# Patient Record
Sex: FEMALE | Race: WHITE | NOT HISPANIC OR LATINO | Employment: OTHER | URBAN - NONMETROPOLITAN AREA
[De-identification: names, ages, dates, MRNs, and addresses within clinical notes are randomized per-mention and may not be internally consistent; named-entity substitution may affect disease eponyms.]

---

## 2017-08-11 ENCOUNTER — APPOINTMENT (OUTPATIENT)
Dept: WOUND CARE | Facility: HOSPITAL | Age: 67
End: 2017-08-11

## 2017-08-11 ENCOUNTER — LAB REQUISITION (OUTPATIENT)
Dept: LAB | Facility: HOSPITAL | Age: 67
End: 2017-08-11

## 2017-08-11 DIAGNOSIS — Z00.00 ENCOUNTER FOR GENERAL ADULT MEDICAL EXAMINATION WITHOUT ABNORMAL FINDINGS: ICD-10-CM

## 2017-08-11 PROCEDURE — 87205 SMEAR GRAM STAIN: CPT | Performed by: SURGERY

## 2017-08-11 PROCEDURE — 87075 CULTR BACTERIA EXCEPT BLOOD: CPT | Performed by: SURGERY

## 2017-08-11 PROCEDURE — G0463 HOSPITAL OUTPT CLINIC VISIT: HCPCS

## 2017-08-11 PROCEDURE — 87185 SC STD ENZYME DETCJ PER NZM: CPT | Performed by: SURGERY

## 2017-08-11 PROCEDURE — 87070 CULTURE OTHR SPECIMN AEROBIC: CPT | Performed by: SURGERY

## 2017-08-11 PROCEDURE — 87186 SC STD MICRODIL/AGAR DIL: CPT | Performed by: SURGERY

## 2017-08-11 PROCEDURE — 87147 CULTURE TYPE IMMUNOLOGIC: CPT | Performed by: SURGERY

## 2017-08-13 LAB
BACTERIA SPEC AEROBE CULT: ABNORMAL
GRAM STN SPEC: ABNORMAL
GRAM STN SPEC: ABNORMAL

## 2017-08-24 ENCOUNTER — APPOINTMENT (OUTPATIENT)
Dept: WOUND CARE | Facility: HOSPITAL | Age: 67
End: 2017-08-24

## 2017-09-05 ENCOUNTER — OFFICE VISIT (OUTPATIENT)
Dept: VASCULAR SURGERY | Facility: CLINIC | Age: 67
End: 2017-09-05

## 2017-09-05 ENCOUNTER — APPOINTMENT (OUTPATIENT)
Dept: WOUND CARE | Facility: HOSPITAL | Age: 67
End: 2017-09-05

## 2017-09-05 VITALS
HEIGHT: 63 IN | HEART RATE: 80 BPM | DIASTOLIC BLOOD PRESSURE: 70 MMHG | BODY MASS INDEX: 44.12 KG/M2 | WEIGHT: 249 LBS | SYSTOLIC BLOOD PRESSURE: 122 MMHG

## 2017-09-05 DIAGNOSIS — I87.8 VENOUS STASIS: ICD-10-CM

## 2017-09-05 DIAGNOSIS — IMO0001 CHRONIC VENOUS HYPERTENSION WITH ULCER, BILATERAL: Primary | ICD-10-CM

## 2017-09-05 DIAGNOSIS — I87.2 VENOUS (PERIPHERAL) INSUFFICIENCY: ICD-10-CM

## 2017-09-05 DIAGNOSIS — M79.89 LEG SWELLING: ICD-10-CM

## 2017-09-05 PROCEDURE — 99204 OFFICE O/P NEW MOD 45 MIN: CPT | Performed by: SURGERY

## 2017-09-05 NOTE — PROGRESS NOTES
09/05/2017      Clemente Arceo MD  [address] [facility]  [facility] [address]  [place] [address]    Lauren Colón  1950    Chief Complaint   Patient presents with   • Chronic Venous Insufficiency     New referral per Dr Arceo       Dear Clemente Arceo MD:      HPI  I had the pleasure of seeing your patient Lauren Colón in the office today.  Thank you kindly for this consultation.  As you recall, Lauren Colón is a 67 y.o.  female who you are currently following for Lower extremity venous wounds.  She has had a long-standing history of leg swelling with ulceration of bilateral lower extremity shins.  She has evidence of stasis dermatitis and chronic swelling.  She has never tried compression stockings and denies any history of DVT or phlebitis.    Past Medical History:   Diagnosis Date   • Arthritis    • Cataract    • Cataract    • Chronic venous insufficiency    • Diabetes mellitus    • Disease of thyroid gland    • Gout    • History of cellulitis    • Leg swelling    • Neuropathy    • Sleep apnea        Past Surgical History:   Procedure Laterality Date   • CHOLECYSTECTOMY     • OVARIAN CYST SURGERY Right    • TUBAL ABDOMINAL LIGATION         Family History   Problem Relation Age of Onset   • Diabetes Mother    • Hearing loss Maternal Grandmother    • Hearing loss Maternal Grandfather    • Cancer Other    • Seizures Other    • Thyroid disease Other    • Tuberculosis Other    • Tuberculosis Father        Social History     Social History   • Marital status:      Spouse name: N/A   • Number of children: N/A   • Years of education: N/A     Occupational History   • Not on file.     Social History Main Topics   • Smoking status: Former Smoker     Quit date: 2014   • Smokeless tobacco: Never Used   • Alcohol use Yes      Comment: rare   • Drug use: No   • Sexual activity: Defer     Other Topics Concern   • Not on file     Social History Narrative       Allergies   Allergen Reactions   • Tofranil  "[Imipramine Hcl] Itching       Prior to Admission medications    Medication Sig Start Date End Date Taking? Authorizing Provider   citalopram (CeleXA) 20 MG tablet Take 20 mg by mouth Daily.   Yes Historical Provider, MD   clonazePAM (KlonoPIN) 0.5 MG tablet  8/16/17  Yes Historical Provider, MD   donepezil (ARICEPT) 10 MG tablet Take 10 mg by mouth Every Night.   Yes Historical Provider, MD   insulin detemir (LEVEMIR) 100 UNIT/ML injection Inject  under the skin Daily.   Yes Historical Provider, MD   levothyroxine (SYNTHROID, LEVOTHROID) 137 MCG tablet Take 137 mcg by mouth Daily.   Yes Historical Provider, MD   metFORMIN (GLUCOPHAGE) 850 MG tablet Take 850 mg by mouth.   Yes Historical Provider, MD   Multiple Vitamins-Minerals (MULTIVITAMIN PO) Take  by mouth.   Yes Historical Provider, MD   mupirocin (BACTROBAN) 2 % ointment Apply  topically.   Yes Historical Provider, MD   Nystatin powder    Yes Historical Provider, MD   pantoprazole (PROTONIX) 40 MG EC tablet Take 40 mg by mouth Daily.   Yes Historical Provider, MD   QUEtiapine (SEROquel) 25 MG tablet  8/16/17  Yes Historical Provider, MD   simvastatin (ZOCOR) 40 MG tablet Take 40 mg by mouth Every Night.   Yes Historical Provider, MD   cimetidine (TAGAMET) 300 MG tablet Take 300 mg by mouth 2 (Two) Times a Day.  9/5/17 Yes Historical Provider, MD       Review of Systems   Constitutional: Negative.    HENT: Negative.    Eyes: Negative.    Respiratory: Negative.    Cardiovascular: Negative.    Gastrointestinal: Negative.    Endocrine: Negative.    Genitourinary: Negative.    Musculoskeletal: Negative.    Skin: Positive for color change and wound (bilateral lower extremities).   Allergic/Immunologic: Negative.    Neurological: Negative.    Hematological: Negative.    Psychiatric/Behavioral: Negative.        /70  Pulse 80  Ht 63\" (160 cm)  Wt 249 lb (113 kg)  BMI 44.11 kg/m2  Physical Exam   Constitutional: She is oriented to person, place, and time. She " appears well-developed and well-nourished.   HENT:   Head: Normocephalic and atraumatic.   Eyes: Pupils are equal, round, and reactive to light. No scleral icterus.   Neck: Neck supple. No JVD present. Carotid bruit is not present. No thyromegaly present.   Cardiovascular: Normal rate, regular rhythm and normal heart sounds.    Pulses:       Carotid pulses are 2+ on the right side, and 2+ on the left side.       Femoral pulses are 2+ on the right side, and 2+ on the left side.       Popliteal pulses are 2+ on the right side, and 2+ on the left side.        Dorsalis pedis pulses are 2+ on the right side, and 2+ on the left side.        Posterior tibial pulses are 2+ on the right side, and 2+ on the left side.   Bilateral lower extremity leg swelling with ulceration and stasis dermatitis   Pulmonary/Chest: Effort normal and breath sounds normal.   Abdominal: Soft. Bowel sounds are normal. She exhibits no distension, no abdominal bruit and no mass. There is no hepatosplenomegaly. There is no tenderness.   Musculoskeletal: Normal range of motion. She exhibits no edema.        Legs:  Lymphadenopathy:     She has no cervical adenopathy.   Neurological: She is alert and oriented to person, place, and time. She has normal strength. No cranial nerve deficit or sensory deficit.   Skin: Skin is warm, dry and intact.   Psychiatric: She has a normal mood and affect.   Nursing note and vitals reviewed.      No results found.    Patient Active Problem List   Diagnosis   • Neuropathy   • Leg swelling   • Chronic venous insufficiency         ICD-10-CM ICD-9-CM   1. Chronic venous hypertension with ulcer, bilateral I87.313 459.31   2. Venous (peripheral) insufficiency I87.2 459.81   3. Leg swelling M79.89 729.81   4. Venous stasis I87.8 459.81       Plan: After thoroughly evaluating Lauren Colón, I believe the best course of action is to proceed with some further testing.  I would like for her to obtain a 2 leg VVI to see how  much venous insufficiency she has.  She does have evidence of venous insufficiency with ulceration and stasis dermatitis.  I will see her back in the next 1-2 weeks with this testing to see if she is a good candidate for radiofrequency ablation.  In the meantime she will need compressive therapy to her lower extremities.  Instructions were given on how to wear compression stockings on a daily basis.  She should also keep her legs elevated when she is not on them.  The patient can continue taking her current medication regimen as previously planned.  This was all discussed in full with complete understanding.    Thank you for allowing me to participate in the care of your patient.  Please do not hesitate with any questions or concerns.  I will keep you aware of any further encounters with Lauren Colón.        Sincerely yours,         Romaine Colon, DO Velma Aponte MD

## 2017-09-11 ENCOUNTER — APPOINTMENT (OUTPATIENT)
Dept: WOUND CARE | Facility: HOSPITAL | Age: 67
End: 2017-09-11

## 2017-09-11 PROCEDURE — G0463 HOSPITAL OUTPT CLINIC VISIT: HCPCS

## 2017-09-13 ENCOUNTER — HOSPITAL ENCOUNTER (OUTPATIENT)
Dept: ULTRASOUND IMAGING | Facility: HOSPITAL | Age: 67
Discharge: HOME | End: 2017-09-13
Admitting: NURSE PRACTITIONER

## 2017-09-13 ENCOUNTER — OFFICE VISIT (OUTPATIENT)
Dept: VASCULAR SURGERY | Facility: CLINIC | Age: 67
End: 2017-09-13

## 2017-09-13 VITALS
DIASTOLIC BLOOD PRESSURE: 74 MMHG | WEIGHT: 252 LBS | SYSTOLIC BLOOD PRESSURE: 132 MMHG | BODY MASS INDEX: 44.65 KG/M2 | HEIGHT: 63 IN | HEART RATE: 74 BPM

## 2017-09-13 DIAGNOSIS — I87.2 VENOUS (PERIPHERAL) INSUFFICIENCY: ICD-10-CM

## 2017-09-13 DIAGNOSIS — G62.9 NEUROPATHY: ICD-10-CM

## 2017-09-13 DIAGNOSIS — I87.2 CHRONIC VENOUS INSUFFICIENCY: Primary | ICD-10-CM

## 2017-09-13 PROCEDURE — 93970 EXTREMITY STUDY: CPT

## 2017-09-13 PROCEDURE — 93970 EXTREMITY STUDY: CPT | Performed by: SURGERY

## 2017-09-13 PROCEDURE — 99213 OFFICE O/P EST LOW 20 MIN: CPT | Performed by: NURSE PRACTITIONER

## 2017-09-13 NOTE — PROGRESS NOTES
"09/13/2017      Velma Aponte MD  [address] [place] [address]        Lauren Almeida Pitcher  1950    Chief Complaint   Patient presents with   • Follow-up     Vascular study results lower extremities.       Dear Velma Aponte MD:    HPI     I had the pleasure of seeing you patient in the office today for follow up.  As you recall, the patient is a 67 y.o. female who we are currently following for lower extremity wounds.  She has been followed by wound care.  She has had a long-standing history of leg swelling with ulceration of bilateral lower extremity shins.  She has evidence of stasis dermatitis and chronic swelling.  She did have noninvasive testing performed today, which I did review in office.       /74  Pulse 74  Ht 63\" (160 cm)  Wt 252 lb (114 kg)  BMI 44.64 kg/m2  Physical Exam  Constitutional: She is oriented to person, place, and time. She appears well-developed and well-nourished.   HENT:   Head: Normocephalic and atraumatic.   Eyes: Pupils are equal, round, and reactive to light. No scleral icterus.   Neck: Neck supple. No JVD present. Carotid bruit is not present. No thyromegaly present.   Cardiovascular: Normal rate, regular rhythm and normal heart sounds.    Pulses:       Carotid pulses are 2+ on the right side, and 2+ on the left side.       Femoral pulses are 2+ on the right side, and 2+ on the left side.       Popliteal pulses are 2+ on the right side, and 2+ on the left side.        Dorsalis pedis pulses are 2+ on the right side, and 2+ on the left side.        Posterior tibial pulses are 2+ on the right side, and 2+ on the left side.   Bilateral lower extremity leg swelling with ulceration and stasis dermatitis   Pulmonary/Chest: Effort normal and breath sounds normal.   Abdominal: Soft. Bowel sounds are normal. She exhibits no distension, no abdominal bruit and no mass. There is no hepatosplenomegaly. There is no tenderness.   Musculoskeletal: Normal range of " motion. She exhibits no edema.   Healing wound       Legs:  Lymphadenopathy:     She has no cervical adenopathy.   Neurological: She is alert and oriented to person, place, and time. She has normal strength. No cranial nerve deficit or sensory deficit.   Skin: Skin is warm, dry and intact.   Psychiatric: She has a normal mood and affect.   Nursing note and vitals reviewed.    DIAGNOSTIC DATA: Noninvasive testing including a venous duplex shows very mild insufficiency in her right lower extremity with no evidence of venous insufficiency to her left leg.      Patient Active Problem List   Diagnosis   • Neuropathy   • Leg swelling   • Chronic venous insufficiency         ICD-10-CM ICD-9-CM   1. Chronic venous insufficiency I87.2 459.81   2. Neuropathy G62.9 355.9       PLAN: After thoroughly evaluating Lauren Colón, I believe the best course of action is to remain conservative from a vascular standpoint.  Her testing shows very mild insufficiency in her right leg and none in the left.  I did give her prescription for compression stockings and instructed her on how to wear these on a daily basis.  We will see her back in 1 year for continued surveillance.  Her wounds have essentially healed The patient is to continue taking their medications as previously discussed.   This was all discussed in full with complete understanding.  Thank you for allowing me to participate in the care of your patient.  Please do not hesitate to call with any questions or concerns.  We will keep you aware of any further encounters with Lauren Colón.      Sincerely Yours,      YSABEL Carrasquillo

## 2017-09-25 ENCOUNTER — APPOINTMENT (OUTPATIENT)
Dept: WOUND CARE | Facility: HOSPITAL | Age: 67
End: 2017-09-25

## 2017-10-06 ENCOUNTER — APPOINTMENT (OUTPATIENT)
Dept: WOUND CARE | Facility: HOSPITAL | Age: 67
End: 2017-10-06

## 2017-10-13 ENCOUNTER — APPOINTMENT (OUTPATIENT)
Dept: WOUND CARE | Facility: HOSPITAL | Age: 67
End: 2017-10-13

## 2017-10-13 PROCEDURE — G0463 HOSPITAL OUTPT CLINIC VISIT: HCPCS

## 2018-09-06 ENCOUNTER — TELEPHONE (OUTPATIENT)
Dept: VASCULAR SURGERY | Facility: CLINIC | Age: 68
End: 2018-09-06

## 2018-09-06 NOTE — TELEPHONE ENCOUNTER
Called to remind Mrs Colón of her appointment for Friday, September 7th, 2018 at 1115 am with Sonia GRADY. Mr Colón answered and stated Mrs Colón was not there and said miller.

## 2018-09-07 ENCOUNTER — TELEPHONE (OUTPATIENT)
Dept: VASCULAR SURGERY | Facility: CLINIC | Age: 68
End: 2018-09-07

## 2018-09-07 NOTE — TELEPHONE ENCOUNTER
The patient missed appt today 9/7/18.  I called the number on the account and the son answered.  He said he forgot about the appt and would have to reschedule at a later day.

## 2019-08-09 ENCOUNTER — OFFICE VISIT (OUTPATIENT)
Dept: NEUROLOGY | Facility: CLINIC | Age: 69
End: 2019-08-09

## 2019-08-09 VITALS
HEIGHT: 63 IN | HEART RATE: 91 BPM | DIASTOLIC BLOOD PRESSURE: 80 MMHG | WEIGHT: 260 LBS | OXYGEN SATURATION: 98 % | BODY MASS INDEX: 46.07 KG/M2 | SYSTOLIC BLOOD PRESSURE: 130 MMHG | RESPIRATION RATE: 24 BRPM

## 2019-08-09 DIAGNOSIS — J44.9 CHRONIC OBSTRUCTIVE PULMONARY DISEASE, UNSPECIFIED COPD TYPE (HCC): ICD-10-CM

## 2019-08-09 DIAGNOSIS — J43.9 PULMONARY EMPHYSEMA, UNSPECIFIED EMPHYSEMA TYPE (HCC): ICD-10-CM

## 2019-08-09 DIAGNOSIS — G47.34 NOCTURNAL HYPOXIA: ICD-10-CM

## 2019-08-09 DIAGNOSIS — G47.33 OBSTRUCTIVE SLEEP APNEA: Primary | ICD-10-CM

## 2019-08-09 PROCEDURE — 99203 OFFICE O/P NEW LOW 30 MIN: CPT | Performed by: NURSE PRACTITIONER

## 2019-08-09 RX ORDER — CIMETIDINE 300 MG/1
300 TABLET, FILM COATED ORAL
COMMUNITY
End: 2019-11-19
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
## 2019-08-09 NOTE — PATIENT INSTRUCTIONS
Sleep Apnea  Sleep apnea is a condition in which breathing pauses or becomes shallow during sleep. Episodes of sleep apnea usually last 10 seconds or longer, and they may occur as many as 20 times an hour. Sleep apnea disrupts your sleep and keeps your body from getting the rest that it needs. This condition can increase your risk of certain health problems, including:  · Heart attack.  · Stroke.  · Obesity.  · Diabetes.  · Heart failure.  · Irregular heartbeat.  There are three kinds of sleep apnea:  · Obstructive sleep apnea. This kind is caused by a blocked or collapsed airway.  · Central sleep apnea. This kind happens when the part of the brain that controls breathing does not send the correct signals to the muscles that control breathing.  · Mixed sleep apnea. This is a combination of obstructive and central sleep apnea.  What are the causes?  The most common cause of this condition is a collapsed or blocked airway. An airway can collapse or become blocked if:  · Your throat muscles are abnormally relaxed.  · Your tongue and tonsils are larger than normal.  · You are overweight.  · Your airway is smaller than normal.  What increases the risk?  This condition is more likely to develop in people who:  · Are overweight.  · Smoke.  · Have a smaller than normal airway.  · Are elderly.  · Are male.  · Drink alcohol.  · Take sedatives or tranquilizers.  · Have a family history of sleep apnea.  What are the signs or symptoms?  Symptoms of this condition include:  · Trouble staying asleep.  · Daytime sleepiness and tiredness.  · Irritability.  · Loud snoring.  · Morning headaches.  · Trouble concentrating.  · Forgetfulness.  · Decreased interest in sex.  · Unexplained sleepiness.  · Mood swings.  · Personality changes.  · Feelings of depression.  · Waking up often during the night to urinate.  · Dry mouth.  · Sore throat.  How is this diagnosed?  This condition may be diagnosed with:  · A medical history.  · A physical  exam.  · A series of tests that are done while you are sleeping (sleep study). These tests are usually done in a sleep lab, but they may also be done at home.  How is this treated?  Treatment for this condition aims to restore normal breathing and to ease symptoms during sleep. It may involve managing health issues that can affect breathing, such as high blood pressure or obesity. Treatment may include:  · Sleeping on your side.  · Using a decongestant if you have nasal congestion.  · Avoiding the use of depressants, including alcohol, sedatives, and narcotics.  · Losing weight if you are overweight.  · Making changes to your diet.  · Quitting smoking.  · Using a device to open your airway while you sleep, such as:  ? An oral appliance. This is a custom-made mouthpiece that shifts your lower jaw forward.  ? A continuous positive airway pressure (CPAP) device. This device delivers oxygen to your airway through a mask.  ? A nasal expiratory positive airway pressure (EPAP) device. This device has valves that you put into each nostril.  ? A bi-level positive airway pressure (BPAP) device. This device delivers oxygen to your airway through a mask.  · Surgery if other treatments do not work. During surgery, excess tissue is removed to create a wider airway.  It is important to get treatment for sleep apnea. Without treatment, this condition can lead to:  · High blood pressure.  · Coronary artery disease.  · (Men) An inability to achieve or maintain an erection (impotence).  · Reduced thinking abilities.  Follow these instructions at home:  · Make any lifestyle changes that your health care provider recommends.  · Eat a healthy, well-balanced diet.  · Take over-the-counter and prescription medicines only as told by your health care provider.  · Avoid using depressants, including alcohol, sedatives, and narcotics.  · Take steps to lose weight if you are overweight.  · If you were given a device to open your airway while you  sleep, use it only as told by your health care provider.  · Do not use any tobacco products, such as cigarettes, chewing tobacco, and e-cigarettes. If you need help quitting, ask your health care provider.  · Keep all follow-up visits as told by your health care provider. This is important.  Contact a health care provider if:  · The device that you received to open your airway during sleep is uncomfortable or does not seem to be working.  · Your symptoms do not improve.  · Your symptoms get worse.  Get help right away if:  · You develop chest pain.  · You develop shortness of breath.  · You develop discomfort in your back, arms, or stomach.  · You have trouble speaking.  · You have weakness on one side of your body.  · You have drooping in your face.  These symptoms may represent a serious problem that is an emergency. Do not wait to see if the symptoms will go away. Get medical help right away. Call your local emergency services (911 in the [place] Do not drive yourself to the hospital.  This information is not intended to replace advice given to you by your health care provider. Make sure you discuss any questions you have with your health care provider.  Document Released: 12/08/2003 Document Revised: 07/16/2018 Document Reviewed: 09/26/2016  Elsevier Interactive Patient Education © 2019 [employer]

## 2019-09-16 DIAGNOSIS — G47.33 OBSTRUCTIVE SLEEP APNEA: Primary | ICD-10-CM

## 2019-09-30 DIAGNOSIS — G47.33 OBSTRUCTIVE SLEEP APNEA (ADULT) (PEDIATRIC): Primary | ICD-10-CM

## 2019-10-08 ENCOUNTER — HOSPITAL ENCOUNTER (OUTPATIENT)
Dept: SLEEP MEDICINE | Facility: HOSPITAL | Age: 69
Discharge: HOME | End: 2019-10-08
Admitting: PHYSICIAN ASSISTANT

## 2019-10-08 VITALS
BODY MASS INDEX: 44.65 KG/M2 | HEART RATE: 85 BPM | SYSTOLIC BLOOD PRESSURE: 120 MMHG | HEIGHT: 63 IN | DIASTOLIC BLOOD PRESSURE: 63 MMHG | WEIGHT: 252 LBS | OXYGEN SATURATION: 91 % | RESPIRATION RATE: 18 BRPM

## 2019-10-08 DIAGNOSIS — G47.33 OBSTRUCTIVE SLEEP APNEA (ADULT) (PEDIATRIC): ICD-10-CM

## 2019-10-08 PROCEDURE — 95810 POLYSOM 6/> YRS 4/> PARAM: CPT | Performed by: PSYCHIATRY & NEUROLOGY

## 2019-10-08 PROCEDURE — 95810 POLYSOM 6/> YRS 4/> PARAM: CPT

## 2019-11-19 ENCOUNTER — OFFICE VISIT (OUTPATIENT)
Dept: NEUROLOGY | Facility: CLINIC | Age: 69
End: 2019-11-19

## 2019-11-19 VITALS
BODY MASS INDEX: 43.59 KG/M2 | DIASTOLIC BLOOD PRESSURE: 78 MMHG | WEIGHT: 246 LBS | HEIGHT: 63 IN | HEART RATE: 93 BPM | SYSTOLIC BLOOD PRESSURE: 112 MMHG | OXYGEN SATURATION: 92 %

## 2019-11-19 DIAGNOSIS — J44.9 CHRONIC OBSTRUCTIVE PULMONARY DISEASE, UNSPECIFIED COPD TYPE (HCC): Primary | ICD-10-CM

## 2019-11-19 DIAGNOSIS — E66.2 OBESITY HYPOVENTILATION SYNDROME (HCC): ICD-10-CM

## 2019-11-19 PROCEDURE — 99213 OFFICE O/P EST LOW 20 MIN: CPT | Performed by: PHYSICIAN ASSISTANT

## 2019-11-19 RX ORDER — QUETIAPINE FUMARATE 50 MG/1
50 TABLET, FILM COATED ORAL
Refills: 0 | COMMUNITY
Start: 2019-08-26 | End: 2020-01-09 | Stop reason: ALTCHOICE

## 2019-11-19 RX ORDER — CALCIUM CITRATE/VITAMIN D3 200MG-6.25
TABLET ORAL
COMMUNITY
Start: 2019-08-26

## 2019-11-19 RX ORDER — CITALOPRAM 20 MG/1
TABLET ORAL
COMMUNITY
Start: 2019-11-18

## 2019-11-19 RX ORDER — PEN NEEDLE, DIABETIC 32 GX3/16"
NEEDLE, DISPOSABLE MISCELLANEOUS
COMMUNITY
Start: 2019-10-16

## 2019-11-19 RX ORDER — INSULIN ASPART 100 [IU]/ML
INJECTION, SOLUTION INTRAVENOUS; SUBCUTANEOUS
COMMUNITY
Start: 2019-11-08

## 2019-11-19 NOTE — PROGRESS NOTES
Neurology Progress Note      Chief Complaint:    Daytime somnolence    Subjective     Subjective:  This is a 61-year-old female who presents today for follow-up after undergoing polysomnography for evaluation of daytime somnolence.  Patient has a history of obstructive sleep apnea, however, sleep study did not demonstrate any findings of significant obstructive or central sleep apnea.  There was some question of alveolar hypoventilation syndrome.  The patient presumably has a diagnosis of COPD from the past, however, has reportedly never been evaluated by pulmonologist.  She does report that she had previously been on numerous inhalers, but due to insurance changes, went off of these a number of years ago.  She does have significant obesity and at times has been more than 400 pounds.  Her current BMI is 43.6.  Her STOP-BANG remains high, however, as stated above, her polysomnography did not suggest apnea.  She states that she sleeps well at night, however, is tired and takes naps throughout the day.  She continues to wear nocturnal submental oxygenation, but does not know who prescribes this nor the flow rate.      Past Medical History:   Diagnosis Date   • Arthritis    • Cataract    • Cataract    • Chronic venous insufficiency    • Diabetes mellitus (CMS/HCC)    • Disease of thyroid gland    • Gout    • History of cellulitis    • Leg swelling    • Neuropathy    • Sleep apnea      Past Surgical History:   Procedure Laterality Date   • CHOLECYSTECTOMY     • OVARIAN CYST SURGERY Right    • TUBAL ABDOMINAL LIGATION       Family History   Problem Relation Age of Onset   • Diabetes Mother    • Hearing loss Maternal Grandmother    • Hearing loss Maternal Grandfather    • Cancer Other    • Seizures Other    • Thyroid disease Other    • Tuberculosis Other    • Tuberculosis Father      Social History     Tobacco Use   • Smoking status: Former Smoker     Last attempt to quit:      Years since quittin.8   • Smokeless  tobacco: Never Used   Substance Use Topics   • Alcohol use: Yes     Comment: rare   • Drug use: No       Medications:  Current Outpatient Medications   Medication Sig Dispense Refill   • citalopram (CeleXA) 20 MG tablet      • clonazePAM (KlonoPIN) 0.5 MG tablet Take 0.5 mg by mouth As Needed.     • donepezil (ARICEPT) 10 MG tablet Take 10 mg by mouth Every Night.     • DROPLET PEN NEEDLES 32G X 5 MM misc      • insulin detemir (LEVEMIR) 100 UNIT/ML injection Inject  under the skin Daily.     • levothyroxine (SYNTHROID, LEVOTHROID) 137 MCG tablet Take 137 mcg by mouth Daily.     • metFORMIN (GLUCOPHAGE) 850 MG tablet Take 850 mg by mouth.     • Multiple Vitamins-Minerals (MULTIVITAMIN PO) Take  by mouth.     • mupirocin (BACTROBAN) 2 % ointment Apply  topically.     • NOVOLOG FLEXPEN 100 UNIT/ML solution pen-injector sc pen      • Nystatin powder      • pantoprazole (PROTONIX) 40 MG EC tablet Take 40 mg by mouth Daily.     • QUEtiapine (SEROquel) 50 MG tablet Take 50 mg by mouth every night at bedtime.  0   • simvastatin (ZOCOR) 40 MG tablet Take 40 mg by mouth Every Night.     • TRUE METRIX BLOOD GLUCOSE TEST test strip        No current facility-administered medications for this visit.        Allergies:    Tofranil [imipramine hcl]    Review of Systems:   -A 14 point review of systems is completed and is negative.      Objective      Vital Signs  Heart Rate:  [93] 93  BP: (112)/(78) 112/78    Physical Exam:    General Exam:  Head:  Normocephalic, atraumatic.  HEENT: PERRLA.  Full EOM.  Neck:  No lymphadenopathy, thyromegaly or bruit.  Cardiac:  Regular rate and rhythm.  Normal S1, S2.  No murmur, rub or gallop.  Lungs:  Clear to auscultation bilaterally.  No wheeze, rales or rhonchi.  Abdomen:  Non-tender, Non-distended.  Bowel sounds normoactive.  Extremities: Full peripheral pulses.  No clubbing, cyanosis or edema.  Skin: No ulceration, breakdown or rash.      Neurologic Exam:  Mental Status:    -Awake. Alert.  Oriented to person, place & time.  -No word finding difficulties.  -No aphasia.  -No dysarthria.  -Follows simple commands.      Gait  -No signs of ataxia  -ambulates unassisted       Results Review:    I reviewed the patient's new clinical results and findings.      No components found for: A1C  No results found for: HDL, LDL  No components found for: B12  No results found for: TSH    Assessment/Plan     Impression:  1.  Alveolar hypoventilation syndrome  2.  Previous diagnosis of COPD  3.  Obesity/BMI 43.6      Plan:  1.  No evidence of obstructive sleep apnea.  I reviewed this with the patient and her son, Anoop, who was present today.    2.  Referred to pulmonology for evaluation and treatment of COPD with likely updated PFT and assessment and treatment of likely obesity-hypoventilation syndrome and alveolar hypoventilation.    3.  Continue nocturnal oxygen supplementation.    4.   I have recommended instituting regular cardiovascular exercise in the form of walking, biking or swimming 30-40 minutes at a time at least 3-4 times per week.    5.  Work with primary care on weight reduction plans and assistance.    The patient and her son voiced understanding and agreement with plan of care.  He will call for any concerns or questions in the interim.  We reviewed pertinent diagnostic studies and the potential risks and benefits of the prescribed regimen of treatment.    We discussed compliance of the prescribed treatment regimen and instructions on medication, therapy, physical activity, etc. and potential for improvement and impact these have on their healing/recovery and risk reduction in the future.    I spent greater than 20 minutes in direct face to face contact with the patient with greater than 50% of the time being spent in education and counseling.          Wili Jacobs PA-C  11/19/19  9:25 AM

## 2019-12-27 ENCOUNTER — TELEPHONE (OUTPATIENT)
Dept: PODIATRY | Facility: CLINIC | Age: 69
End: 2019-12-27

## 2019-12-30 PROBLEM — E11.621 TYPE 2 DIABETES MELLITUS WITH FOOT ULCER (CODE) (HCC): Status: ACTIVE | Noted: 2019-12-30

## 2019-12-30 PROBLEM — K21.9 GERD WITHOUT ESOPHAGITIS: Status: ACTIVE | Noted: 2019-12-30

## 2019-12-30 PROBLEM — E07.9 THYROID DISEASE: Status: ACTIVE | Noted: 2019-12-30

## 2019-12-30 PROBLEM — Z87.891 PERSONAL HISTORY OF NICOTINE DEPENDENCE: Status: ACTIVE | Noted: 2019-12-30

## 2019-12-30 PROBLEM — G47.34 NOCTURNAL HYPOXIA: Status: ACTIVE | Noted: 2019-12-30

## 2019-12-30 PROBLEM — E66.2 OBESITY HYPOVENTILATION SYNDROME (HCC): Status: ACTIVE | Noted: 2019-12-30

## 2019-12-30 PROBLEM — E66.01 MORBID OBESITY DUE TO EXCESS CALORIES (HCC): Status: ACTIVE | Noted: 2019-12-30

## 2020-01-09 ENCOUNTER — OFFICE VISIT (OUTPATIENT)
Dept: PULMONOLOGY | Facility: CLINIC | Age: 70
End: 2020-01-09

## 2020-01-09 VITALS
DIASTOLIC BLOOD PRESSURE: 72 MMHG | HEIGHT: 61 IN | HEART RATE: 86 BPM | WEIGHT: 233 LBS | OXYGEN SATURATION: 93 % | SYSTOLIC BLOOD PRESSURE: 118 MMHG | BODY MASS INDEX: 43.99 KG/M2

## 2020-01-09 DIAGNOSIS — J44.9 COPD, GROUP C, BY GOLD 2017 CLASSIFICATION (HCC): ICD-10-CM

## 2020-01-09 DIAGNOSIS — E11.621 TYPE 2 DIABETES MELLITUS WITH FOOT ULCER (CODE) (HCC): ICD-10-CM

## 2020-01-09 DIAGNOSIS — G47.34 NOCTURNAL HYPOXIA: ICD-10-CM

## 2020-01-09 DIAGNOSIS — J44.9 CHRONIC OBSTRUCTIVE PULMONARY DISEASE, UNSPECIFIED COPD TYPE (HCC): Primary | ICD-10-CM

## 2020-01-09 DIAGNOSIS — Z87.891 PERSONAL HISTORY OF NICOTINE DEPENDENCE: ICD-10-CM

## 2020-01-09 DIAGNOSIS — E07.9 THYROID DISEASE: ICD-10-CM

## 2020-01-09 DIAGNOSIS — K21.9 GERD WITHOUT ESOPHAGITIS: ICD-10-CM

## 2020-01-09 DIAGNOSIS — E66.2 OBESITY HYPOVENTILATION SYNDROME (HCC): Primary | ICD-10-CM

## 2020-01-09 DIAGNOSIS — J44.9 CHRONIC OBSTRUCTIVE PULMONARY DISEASE, UNSPECIFIED COPD TYPE (HCC): ICD-10-CM

## 2020-01-09 PROCEDURE — 94010 BREATHING CAPACITY TEST: CPT | Performed by: NURSE PRACTITIONER

## 2020-01-09 PROCEDURE — 94664 DEMO&/EVAL PT USE INHALER: CPT | Performed by: NURSE PRACTITIONER

## 2020-01-09 PROCEDURE — 94727 GAS DIL/WSHOT DETER LNG VOL: CPT | Performed by: NURSE PRACTITIONER

## 2020-01-09 PROCEDURE — 94729 DIFFUSING CAPACITY: CPT | Performed by: NURSE PRACTITIONER

## 2020-01-09 PROCEDURE — 99214 OFFICE O/P EST MOD 30 MIN: CPT | Performed by: NURSE PRACTITIONER

## 2020-01-09 NOTE — PROCEDURES
Pulmonary Function Test  Performed by: Sarah Rea APRN  Authorized by: Sarah Rea APRN      Pre Drug    FVC: 67%   FEV1: 49%   FEF 25-75%: 23%   FEV1/FVC: 57.37%   T%   RV: 195%   DLCO: 167%   D/VAsb: 195%

## 2020-01-09 NOTE — PATIENT INSTRUCTIONS

## 2020-03-17 NOTE — TELEPHONE ENCOUNTER
This patient used the Duaklair and Tudorza. She does not think the Duaklair works as well for her. She is requesting a prescription for Tudorza to be called in to [place] [facility].

## 2020-04-07 NOTE — PROGRESS NOTES
" YSABEL Burt  [facility]   Respiratory Disease Clinic  [address] [place] [address]  Phone: [phone]  Fax: [phone]     Lauren Colón is a 69 y.o. female.   CC:   Chief Complaint   Patient presents with   • COPD        HPI: Mrs. Colón is being evaluated today for management of COPD and suspected obesity hypoventilation syndrome.  She experiences moderate persistent shortness of breath, productive coughing and wheezing occurring in the chest daily for 2 years.  It is worsened by nothing and improved by bronchodilators.  She benefited from the Tudorza and Duaklir samples that I provided her.  She reports her insurance provided her with a 30-day supply of the Tudorza which she has been taking.  She is needing a prescription for this or something that is similar but on formulary.  She reports being hospitalized at [facility] around the 17th of last month for COPD exacerbation.  She reports they did not change any of her medications.  She remains on her supplemental oxygen and benefiting from that with sleep.  She continues to have intermittent issues with hypersomnolence.  Recent sleep study indicating no sleep apnea. Symptoms complicated by her morbid obesity and thyroid disease. Arrangements were also made for low-dose lung cancer imaging however she \"no showed\" the CT appointment.  She and her son were both on the phone and the son indicates they got her appointments confused and she did not go for the CAT scan.  She would like to do that in the future.    The following portions of the patient's history were reviewed and updated as appropriate: allergies, current medications, past family history, past medical history, past social history, past surgical history and problem list.    Past Medical History:   Diagnosis Date   • Arthritis    • Cataract    • Cataract    • Chronic venous insufficiency    • COPD, group C, by GOLD 2017 classification (CMS/[facility]) " 1/9/2020   • Diabetes mellitus (CMS/[facility])    • Disease of thyroid gland    • GERD without esophagitis 12/30/2019   • Gout    • History of cellulitis    • Leg swelling    • Neuropathy    • Nocturnal hypoxia 12/30/2019   • Obesity hypoventilation syndrome (CMS/[facility]) 12/30/2019   • Personal history of nicotine dependence 12/30/2019   • Sleep apnea    • Thyroid disease 12/30/2019       Prior to Admission medications    Medication Sig Start Date End Date Taking? Authorizing Provider   aclidinium bromide (Tudorza Pressair) 400 MCG/ACT aerosol powder  powder for inhalation Inhale 1 puff 2 (Two) Times a Day. 3/17/20   Sarah Rea APRN   citalopram (CeleXA) 20 MG tablet  11/18/19   José Long MD   clonazePAM (KlonoPIN) 0.5 MG tablet Take 0.5 mg by mouth As Needed. 8/16/17   José Long MD   donepezil (ARICEPT) 10 MG tablet Take 10 mg by mouth Every Night.    José Long MD   DROPLET PEN NEEDLES 32G X 5 MM misc  10/16/19   José Long MD   insulin detemir (LEVEMIR) 100 UNIT/ML injection Inject  under the skin Daily.    José Long MD   levothyroxine (SYNTHROID, LEVOTHROID) 137 MCG tablet Take 137 mcg by mouth Daily.    José Long MD   metFORMIN (GLUCOPHAGE) 850 MG tablet Take 850 mg by mouth.    José Long MD   Multiple Vitamins-Minerals (MULTIVITAMIN PO) Take  by mouth.    José Long MD   mupirocin (BACTROBAN) 2 % ointment Apply  topically.    José Long MD   NOVOLOG FLEXPEN 100 UNIT/ML solution pen-injector sc pen  11/8/19   José Long MD   Nystatin powder     José Long MD   pantoprazole (PROTONIX) 40 MG EC tablet Take 40 mg by mouth Daily.    José Long MD   simvastatin (ZOCOR) 40 MG tablet Take 40 mg by mouth Every Night.    José Long MD   TRUE METRIX BLOOD GLUCOSE TEST test strip  8/26/19   José Long MD       Family History   Problem Relation Age of Onset   •  Diabetes Mother    • Hearing loss Maternal Grandmother    • Hearing loss Maternal Grandfather    • Cancer Other    • Seizures Other    • Thyroid disease Other    • Tuberculosis Other    • Tuberculosis Father        Social History     Socioeconomic History   • Marital status:      Spouse name: Not on file   • Number of children: Not on file   • Years of education: Not on file   • Highest education level: Not on file   Tobacco Use   • Smoking status: Former Smoker     Packs/day: 3.00     Years: 30.00     Pack years: 90.00     Types: Cigarettes     Last attempt to quit:      Years since quittin.2   • Smokeless tobacco: Never Used   • Tobacco comment: 2-3 packs a day for at least 30 years   Substance and Sexual Activity   • Alcohol use: Yes     Comment: rare   • Drug use: No   • Sexual activity: Defer       Review of Systems   Constitutional: Positive for fatigue. Negative for activity change, chills and fever.   HENT: Positive for congestion. Negative for postnasal drip, rhinorrhea, sinus pressure, sore throat and trouble swallowing.    Eyes: Negative for blurred vision, double vision and pain.   Respiratory: Positive for cough, shortness of breath and wheezing. Negative for chest tightness.    Cardiovascular: Negative for chest pain, palpitations and leg swelling.   Gastrointestinal: Negative for abdominal distention, constipation, diarrhea, nausea and vomiting.   Endocrine: Negative for polydipsia, polyphagia and polyuria.   Genitourinary: Negative for dysuria, frequency and urgency.   Musculoskeletal: Negative for arthralgias, back pain, gait problem and joint swelling.   Skin: Negative for color change, dry skin, rash and skin lesions.   Allergic/Immunologic: Negative for environmental allergies, food allergies and immunocompromised state.   Neurological: Negative for dizziness, seizures, speech difficulty, weakness, light-headedness, memory problem and confusion.   Hematological: Negative for  "adenopathy. Does not bruise/bleed easily.   Psychiatric/Behavioral: Positive for sleep disturbance. Negative for negative for hyperactivity and depressed mood. The patient is not nervous/anxious.        There were no vitals taken for this visit.    Physical Exam:    Complete physical exam not performed due to telephone encounter, COVID 19 Association.  She was awake and alert and did not seem to be confused during our conversation.  She answers all questions appropriately.  Her son was also on the phone and confirmed her information.  She was able to speak in full sentences.  She did not cough or wheeze during conversation.    Pulmonary Functions Testing Results:    PFT Values        Some values may be hidden. Unless noted otherwise, only the newest values recorded on each date are displayed.         Old Values PFT Results 20   No data to display.      Pre Drug PFT Results 20   FVC 67   FEV1 49   FEF 25-75% 23   FEV1/FVC 57.37      Post Drug PFT Results 20   No data to display.      Other Tests PFT Results 20         DLCO 167   D/VAsb 195           Results for orders placed in visit on 20   Pulmonary Function Test    Narrative Romel Chaves MA     2020 12:06 PM  Pulmonary Function Test  Performed by: Sarah Rea APRN  Authorized by: Sarah Rea APRN      Pre Drug    FVC: 67%   FEV1: 49%   FEF 25-75%: 23%   FEV1/FVC: 57.37%   T%   RV: 195%   DLCO: 167%   D/VAsb: 195%         No PFTs for this visit    CXR: No imaging for this visit.  Scheduled for a low-dose CT scan [facility] her [facility] on  however she \"no showed\"        Problem List Items Addressed This Visit        Respiratory    Nocturnal hypoxia    COPD, group C, by GOLD 2017 classification (CMS/[facility]) - Primary    Relevant Medications    Umeclidinium Bromide (Incruse Ellipta) 62.5 MCG/INH aerosol powder        Digestive    Morbid obesity due to excess calories (CMS/[facility])       Endocrine    " Thyroid disease       Other    Personal history of nicotine dependence    Relevant Orders    CT Chest Low Dose Wo        Assessment/Plan         She has had a recent COPD flareup.  She is needing bronchodilators prescribed.  She has benefited from Tudorza.  It appears that Incruse is on her formulary.  I will send in a prescription with refills to her pharmacy.  She will continue her supplemental oxygen.  I will make arrangements for her to have a another CT for lung cancer screening purposes when it is feasible to do so given the current pandemic.  Patient is agreeable that it is appropriate to hold off on scheduling that at this time given the pandemic.  She continues to have issues with hypersomnolence despite treating her COPD appropriately we will make arrangements for overnight oximetry on her oxygen and ABG assessment to determine whether or not she might be a candidate for noninvasive ventilation for her obesity hypoventilation since she does not have sleep apnea.  Patient agrees.  We will update PFTs when she returns as well.  She is aware she can reach out to us if she develops any new or worsening issues before then and we can make arrangements to see her sooner.    This visit has been rescheduled as a phone visit to comply with patient safety concerns in accordance with CDC recommendations. Total time of discussion was 17 minutes.      Sarah Rea, YSABEL  4/16/2020  15:44    Return in about 6 months (around 10/16/2020) for PFT pre/post, ct.

## 2020-04-16 ENCOUNTER — OFFICE VISIT (OUTPATIENT)
Dept: PULMONOLOGY | Facility: CLINIC | Age: 70
End: 2020-04-16

## 2020-04-16 DIAGNOSIS — E66.01 MORBID OBESITY DUE TO EXCESS CALORIES (HCC): ICD-10-CM

## 2020-04-16 DIAGNOSIS — Z87.891 PERSONAL HISTORY OF NICOTINE DEPENDENCE: ICD-10-CM

## 2020-04-16 DIAGNOSIS — J44.9 COPD, GROUP C, BY GOLD 2017 CLASSIFICATION (HCC): Primary | ICD-10-CM

## 2020-04-16 DIAGNOSIS — E07.9 THYROID DISEASE: ICD-10-CM

## 2020-04-16 DIAGNOSIS — G47.34 NOCTURNAL HYPOXIA: ICD-10-CM

## 2020-04-16 PROCEDURE — 99442 PR PHYS/QHP TELEPHONE EVALUATION 11-20 MIN: CPT | Performed by: NURSE PRACTITIONER

## 2020-04-16 RX ORDER — LOPERAMIDE HYDROCHLORIDE 2 MG/1
CAPSULE ORAL
COMMUNITY
Start: 2020-01-14

## 2020-04-16 RX ORDER — VORTIOXETINE 10 MG/1
TABLET, FILM COATED ORAL
COMMUNITY
Start: 2020-04-01

## 2020-04-16 RX ORDER — POLYETHYLENE GLYCOL 3350 17 G/17G
POWDER, FOR SOLUTION ORAL
COMMUNITY
Start: 2020-02-18

## 2020-04-16 RX ORDER — CIMETIDINE 300 MG/1
TABLET, FILM COATED ORAL
COMMUNITY
Start: 2020-03-17

## 2020-05-04 ENCOUNTER — TELEPHONE (OUTPATIENT)
Dept: PODIATRY | Facility: CLINIC | Age: 70
End: 2020-05-04

## 2020-06-05 ENCOUNTER — TELEPHONE (OUTPATIENT)
Dept: PODIATRY | Facility: CLINIC | Age: 70
End: 2020-06-05

## 2020-06-05 NOTE — TELEPHONE ENCOUNTER
Pt was referred for diabetic foot and nail care, denies any other problems. Pt is seeing Jordan in [place] July 9th . Pt voiced understanding DN

## 2020-06-16 PROCEDURE — 88305 TISSUE EXAM BY PATHOLOGIST: CPT | Performed by: INTERNAL MEDICINE

## 2020-06-17 ENCOUNTER — LAB REQUISITION (OUTPATIENT)
Dept: LAB | Facility: HOSPITAL | Age: 70
End: 2020-06-17

## 2020-06-17 DIAGNOSIS — Z00.00 ROUTINE GENERAL MEDICAL EXAMINATION AT A HEALTH CARE FACILITY: ICD-10-CM

## 2020-06-18 LAB
CYTO UR: NORMAL
LAB AP CASE REPORT: NORMAL
LAB AP CLINICAL INFORMATION: NORMAL
PATH REPORT.FINAL DX SPEC: NORMAL
PATH REPORT.GROSS SPEC: NORMAL

## 2020-07-22 ENCOUNTER — TELEPHONE (OUTPATIENT)
Dept: PODIATRY | Facility: CLINIC | Age: 70
End: 2020-07-22

## 2020-07-22 NOTE — TELEPHONE ENCOUNTER
Called pt to let her know we would not be in [place] office tomorrow,  I left a detail message asking her to call back and confirm receiving this and we would be glad to reschedule in [place] or [place] office. DN

## 2020-07-22 NOTE — TELEPHONE ENCOUNTER
Attempted to call pt and remind of appt to see Jordan in [place] tomorrow.  I would also like to move pt appt up.  I left message for pt to call back dn

## 2020-08-06 ENCOUNTER — OFFICE VISIT (OUTPATIENT)
Dept: PODIATRY | Facility: CLINIC | Age: 70
End: 2020-08-06

## 2020-08-06 VITALS
OXYGEN SATURATION: 92 % | HEART RATE: 100 BPM | SYSTOLIC BLOOD PRESSURE: 169 MMHG | BODY MASS INDEX: 44.03 KG/M2 | DIASTOLIC BLOOD PRESSURE: 85 MMHG | WEIGHT: 233.2 LBS | HEIGHT: 61 IN

## 2020-08-06 DIAGNOSIS — Z79.4 TYPE 2 DIABETES MELLITUS WITH DIABETIC POLYNEUROPATHY, WITH LONG-TERM CURRENT USE OF INSULIN (HCC): Primary | ICD-10-CM

## 2020-08-06 DIAGNOSIS — E11.42 TYPE 2 DIABETES MELLITUS WITH DIABETIC POLYNEUROPATHY, WITH LONG-TERM CURRENT USE OF INSULIN (HCC): Primary | ICD-10-CM

## 2020-08-06 DIAGNOSIS — I87.2 CHRONIC VENOUS INSUFFICIENCY: ICD-10-CM

## 2020-08-06 DIAGNOSIS — B35.1 ONYCHOMYCOSIS: ICD-10-CM

## 2020-08-06 PROCEDURE — 99213 OFFICE O/P EST LOW 20 MIN: CPT | Performed by: NURSE PRACTITIONER

## 2020-08-06 NOTE — PATIENT INSTRUCTIONS
Diabetes Mellitus and Foot Care  Foot care is an important part of your health, especially when you have diabetes. Diabetes may cause you to have problems because of poor blood flow (circulation) to your feet and legs, which can cause your skin to:  · Become thinner and drier.  · Break more easily.  · Heal more slowly.  · Peel and crack.  You may also have nerve damage (neuropathy) in your legs and feet, causing decreased feeling in them. This means that you may not notice minor injuries to your feet that could lead to more serious problems. Noticing and addressing any potential problems early is the best way to prevent future foot problems.  How to care for your feet  Foot hygiene  · Wash your feet daily with warm water and mild soap. Do not use hot water. Then, pat your feet and the areas between your toes until they are completely dry. Do not soak your feet as this can dry your skin.  · Trim your toenails straight across. Do not dig under them or around the cuticle. File the edges of your nails with an emery board or nail file.  · Apply a moisturizing lotion or petroleum jelly to the skin on your feet and to dry, brittle toenails. Use lotion that does not contain alcohol and is unscented. Do not apply lotion between your toes.  Shoes and socks  · Wear clean socks or stockings every day. Make sure they are not too tight. Do not wear knee-high stockings since they may decrease blood flow to your legs.  · Wear shoes that fit properly and have enough cushioning. Always look in your shoes before you put them on to be sure there are no objects inside.  · To break in new shoes, wear them for just a few hours a day. This prevents injuries on your feet.  Wounds, scrapes, corns, and calluses  · Check your feet daily for blisters, cuts, bruises, sores, and redness. If you cannot see the bottom of your feet, use a mirror or ask someone for help.  · Do not cut corns or calluses or try to remove them with medicine.  · If you  find a minor scrape, cut, or break in the skin on your feet, keep it and the skin around it clean and dry. You may clean these areas with mild soap and water. Do not clean the area with peroxide, alcohol, or iodine.  · If you have a wound, scrape, corn, or callus on your foot, look at it several times a day to make sure it is healing and not infected. Check for:  ? Redness, swelling, or pain.  ? Fluid or blood.  ? Warmth.  ? Pus or a bad smell.  General instructions  · Do not cross your legs. This may decrease blood flow to your feet.  · Do not use heating pads or hot water bottles on your feet. They may burn your skin. If you have lost feeling in your feet or legs, you may not know this is happening until it is too late.  · Protect your feet from hot and cold by wearing shoes, such as at the beach or on hot pavement.  · Schedule a complete foot exam at least once a year (annually) or more often if you have foot problems. If you have foot problems, report any cuts, sores, or bruises to your health care provider immediately.  Contact a health care provider if:  · You have a medical condition that increases your risk of infection and you have any cuts, sores, or bruises on your feet.  · You have an injury that is not healing.  · You have redness on your legs or feet.  · You feel burning or tingling in your legs or feet.  · You have pain or cramps in your legs and feet.  · Your legs or feet are numb.  · Your feet always feel cold.  · You have pain around a toenail.  Get help right away if:  · You have a wound, scrape, corn, or callus on your foot and:  ? You have pain, swelling, or redness that gets worse.  ? You have fluid or blood coming from the wound, scrape, corn, or callus.  ? Your wound, scrape, corn, or callus feels warm to the touch.  ? You have pus or a bad smell coming from the wound, scrape, corn, or callus.  ? You have a fever.  ? You have a red line going up your leg.  Summary  · Check your feet every day  for cuts, sores, red spots, swelling, and blisters.  · Moisturize feet and legs daily.  · Wear shoes that fit properly and have enough cushioning.  · If you have foot problems, report any cuts, sores, or bruises to your health care provider immediately.  · Schedule a complete foot exam at least once a year (annually) or more often if you have foot problems.  This information is not intended to replace advice given to you by your health care provider. Make sure you discuss any questions you have with your health care provider.  Document Released: 12/15/2001 Document Revised: 01/30/2019 Document Reviewed: 01/19/2018  [employer] Patient Education © 2020 [employer]    Obesity, Adult  Obesity is having too much body fat. Being obese means that your weight is more than what is healthy for you.  BMI is a number that explains how much body fat you have. If you have a BMI of 30 or more, you are obese. Obesity is often caused by eating or drinking more calories than your body uses. Changing your lifestyle can help you lose weight.  Obesity can cause serious health problems, such as:  · Stroke.  · Coronary artery disease (CAD).  · Type 2 diabetes.  · Some types of cancer, including cancers of the colon, breast, uterus, and gallbladder.  · Osteoarthritis.  · High blood pressure (hypertension).  · High cholesterol.  · Sleep apnea.  · Gallbladder stones.  · Infertility problems.  What are the causes?  · Eating meals each day that are high in calories, sugar, and fat.  · Being born with genes that may make you more likely to become obese.  · Having a medical condition that causes obesity.  · Taking certain medicines.  · Sitting a lot (having a sedentary lifestyle).  · Not getting enough sleep.  · Drinking a lot of drinks that have sugar in them.  What increases the risk?  · Having a family history of obesity.  · Being an  woman.  · Being a  man.  · Living in an area with limited access to:  ? [place],  recreation centers, or sidewalks.  ? Healthy food choices, such as grocery stores and farmers' markets.  What are the signs or symptoms?  The main sign is having too much body fat.  How is this treated?  · Treatment for this condition often includes changing your lifestyle. Treatment may include:  ? Changing your diet. This may include making a healthy meal plan.  ? Exercise. This may include activity that causes your heart to beat faster (aerobic exercise) and strength training. Work with your doctor to design a program that works for you.  ? Medicine to help you lose weight. This may be used if you are not able to lose 1 pound a week after 6 weeks of healthy eating and more exercise.  ? Treating conditions that cause the obesity.  ? Surgery. Options may include gastric banding and gastric bypass. This may be done if:  § Other treatments have not helped to improve your condition.  § You have a BMI of 40 or higher.  § You have life-threatening health problems related to obesity.  Follow these instructions at home:  Eating and drinking    · Follow advice from your doctor about what to eat and drink. Your doctor may tell you to:  ? Limit fast food, sweets, and processed snack foods.  ? Choose low-fat options. For example, choose low-fat milk instead of whole milk.  ? Eat 5 or more servings of fruits or vegetables each day.  ? Eat at home more often. This gives you more control over what you eat.  ? Choose healthy foods when you eat out.  ? Learn to read food labels. This will help you learn how much food is in 1 serving.  ? Keep low-fat snacks available.  ? Avoid drinks that have a lot of sugar in them. These include soda, fruit juice, iced tea with sugar, and flavored milk.  · Drink enough water to keep your pee (urine) pale yellow.  · Do not go on fad diets.  Physical activity  · Exercise often, as told by your doctor. Most adults should get up to 150 minutes of moderate-intensity exercise every week.Ask your  doctor:  ? What types of exercise are safe for you.  ? How often you should exercise.  · Warm up and stretch before being active.  · Do slow stretching after being active (cool down).  · Rest between times of being active.  Lifestyle  · Work with your doctor and a food expert (dietitian) to set a weight-loss goal that is best for you.  · Limit your screen time.  · Find ways to reward yourself that do not involve food.  · Do not drink alcohol if:  ? Your doctor tells you not to drink.  ? You are pregnant, may be pregnant, or are planning to become pregnant.  · If you drink alcohol:  ? Limit how much you use to:  § 0-1 drink a day for women.  § 0-2 drinks a day for men.  ? Be aware of how much alcohol is in your drink. In the [place] one drink equals one 12 oz bottle of beer (355 mL), one 5 oz glass of wine (148 mL), or one 1½ oz glass of hard liquor (44 mL).  General instructions  · Keep a weight-loss journal. This can help you keep track of:  ? The food that you eat.  ? How much exercise you get.  · Take over-the-counter and prescription medicines only as told by your doctor.  · Take vitamins and supplements only as told by your doctor.  · Think about joining a support group.  · Keep all follow-up visits as told by your doctor. This is important.  Contact a doctor if:  · You cannot meet your weight loss goal after you have changed your diet and lifestyle for 6 weeks.  Get help right away if you:  · Are having trouble breathing.  · Are having thoughts of harming yourself.  Summary  · Obesity is having too much body fat.  · Being obese means that your weight is more than what is healthy for you.  · Work with your doctor to set a weight-loss goal.  · Get regular exercise as told by your doctor.  This information is not intended to replace advice given to you by your health care provider. Make sure you discuss any questions you have with your health care provider.  Document Released: 03/11/2013 Document Revised:  08/22/2019 Document Reviewed: 08/22/2019  [employer] Patient Education © 2020 [employer]

## 2020-08-07 NOTE — PROGRESS NOTES
"    [facility] - PODIATRY    Today's Date: 08/07/20    Patient Name: Lauren Colón  MRN: [id number]  CSN: [id number]  PCP: Velma Aponte MD  Referring Provider: No ref. provider found    SUBJECTIVE     Chief Complaint   Patient presents with   • Establish Care     pt is here for diabetic foot/nail care - pt denies any pain at this moment; expressed that she has more pain at night - pcp 07/16/2020     HPI: Lauren Colón, a 70 y.o.female, comes to clinic as a(n) new patient presenting for diabetic foot exam. Patient has h/o arthritis, cataract, chronic venous insufficiency, COPD, DM, GERD, Gout, Neuropathy, Obesity, Thyroid disease, Sleep apnea. Patient is IDDM and unsure of last BG level. States that blood glucose is typically around 150; unsure of last A1C. States that she does not presently have any issues with her feet. Does note that toenails tends to \"flake off\" from time to time. She and family have been attempting to care for nails at home. Relates that she is sensitive to shoes touching feet and often goes barefoot in the home. Denies pain at the present time. Denies previous treatment. Former smoker. Denies any constitutional symptoms. No other pedal complaints at this time.    Past Medical History:   Diagnosis Date   • Arthritis    • Cataract    • Cataract    • Chronic venous insufficiency    • COPD, group C, by GOLD 2017 classification (CMS/HCC) 1/9/2020   • Diabetes mellitus (CMS/HCC)    • Disease of thyroid gland    • GERD without esophagitis 12/30/2019   • Gout    • History of cellulitis    • Leg swelling    • Neuropathy    • Nocturnal hypoxia 12/30/2019   • Obesity hypoventilation syndrome (CMS/HCC) 12/30/2019   • Personal history of nicotine dependence 12/30/2019   • Sleep apnea    • Thyroid disease 12/30/2019     Past Surgical History:   Procedure Laterality Date   • CHOLECYSTECTOMY     • OVARIAN CYST SURGERY Right    • TUBAL ABDOMINAL LIGATION       Family History   "   Problem Relation Age of Onset   • Diabetes Mother    • Hearing loss Maternal Grandmother    • Hearing loss Maternal Grandfather    • Cancer Other    • Seizures Other    • Thyroid disease Other    • Tuberculosis Other    • Tuberculosis Father      Social History     Socioeconomic History   • Marital status:      Spouse name: Not on file   • Number of children: Not on file   • Years of education: Not on file   • Highest education level: Not on file   Tobacco Use   • Smoking status: Former Smoker     Packs/day: 3.00     Years: 30.00     Pack years: 90.00     Types: Cigarettes     Last attempt to quit:      Years since quittin.6   • Smokeless tobacco: Never Used   • Tobacco comment: 2-3 packs a day for at least 30 years   Substance and Sexual Activity   • Alcohol use: Yes     Comment: rare   • Drug use: No   • Sexual activity: Defer     Allergies   Allergen Reactions   • Tofranil [Imipramine Hcl] Itching     Current Outpatient Medications   Medication Sig Dispense Refill   • aclidinium bromide (Tudorza Pressair) 400 MCG/ACT aerosol powder  powder for inhalation Inhale 1 puff 2 (Two) Times a Day. 60 each 11   • cimetidine (TAGAMET) 300 MG tablet      • citalopram (CeleXA) 20 MG tablet      • clonazePAM (KlonoPIN) 0.5 MG tablet Take 0.5 mg by mouth As Needed.     • donepezil (ARICEPT) 10 MG tablet Take 10 mg by mouth Every Night.     • DROPLET PEN NEEDLES 32G X 5 MM misc      • insulin detemir (LEVEMIR) 100 UNIT/ML injection Inject  under the skin Daily.     • levothyroxine (SYNTHROID, LEVOTHROID) 137 MCG tablet Take 137 mcg by mouth Daily.     • loperamide (IMODIUM) 2 MG capsule      • metFORMIN (GLUCOPHAGE) 850 MG tablet Take 850 mg by mouth.     • Multiple Vitamins-Minerals (MULTIVITAMIN PO) Take  by mouth.     • mupirocin (BACTROBAN) 2 % ointment Apply  topically.     • NOVOLOG FLEXPEN 100 UNIT/ML solution pen-injector sc pen      • Nystatin powder      • pantoprazole (PROTONIX) 40 MG EC tablet Take  40 mg by mouth Daily.     • polyethylene glycol (MIRALAX) powder      • simvastatin (ZOCOR) 40 MG tablet Take 40 mg by mouth Every Night.     • TRINTELLIX 10 MG tablet      • TRUE METRIX BLOOD GLUCOSE TEST test strip        No current facility-administered medications for this visit.      Review of Systems   Constitutional: Negative for chills and fever.   HENT: Negative for congestion.    Respiratory: Negative for shortness of breath.    Cardiovascular: Negative for chest pain and leg swelling.   Gastrointestinal: Negative for constipation, diarrhea, nausea and vomiting.   Musculoskeletal: Positive for arthralgias (foot pain). Negative for gait problem and myalgias.   Skin: Negative for wound.   Neurological: Positive for numbness.       OBJECTIVE     Vitals:    08/06/20 1332   BP: 169/85   Pulse: 100   SpO2: 92%       PHYSICAL EXAM  GEN:   Accompanied by son.     Foot/Ankle Exam:       General:   Appearance: appears stated age and healthy and obesity    Orientation: AAOx3    Affect: appropriate    Gait: unimpaired    Assistance: independent    Shoe Gear:  Casual shoes    VASCULAR      Right Foot Vascularity   Dorsalis pedis:  1+  Posterior tibial:  1+  Skin Temperature: warm    Edema Grading:  Trace and non-pitting  CFT:  3  Pedal Hair Growth:  Absent  Varicosities: mild varicosities       Left Foot Vascularity   Dorsalis pedis:  1+  Posterior tibial:  1+  Skin Temperature: warm    Edema Grading:  Trace and non-pitting  CFT:  3  Pedal Hair Growth:  Absent  Varicosities: mild varicosities        NEUROLOGIC     Right Foot Neurologic   Light touch sensation:  Diminished  Vibratory sensation:  Diminished  Hot/Cold sensation: diminished    Protective Sensation using Hotchkiss-Shahram Monofilament:  2     Left Foot Neurologic   Light touch sensation:  Diminished  Vibratory sensation:  Diminished  Hot/cold sensation: diminished    Protective Sensation using Hotchkiss-Shahram Monofilament:  4     MUSCULOSKELETAL      Right  Foot Musculoskeletal    Amputation   Right toes amputated: No    Ecchymosis:  None  Tenderness: none    Arch:  Normal  Hallux valgus: No       Left Foot Musculoskeletal    Amputation   Left toes amputated: No    Ecchymosis:  None  Tenderness: none    Arch:  Normal  Hallux valgus: No       DERMATOLOGIC     Right Foot Dermatologic   Skin: dry skin    Skin: no right foot ulcer    Nails: onychomycosis and dystrophic nails    Nails comment:  Nails 1-5 previously ground down/removed     Left Foot Dermatologic   Skin: dry skin    Skin: no left foot ulcer    Nails: onychomycosis and dystrophic nails    Nails comment:  Nails 1-5 previously ground down/removed      RADIOLOGY/NUCLEAR:  No results found.    LABORATORY/CULTURE RESULTS:      PATHOLOGY RESULTS:       ASSESSMENT/PLAN     Lauren was seen today for establish care.    Diagnoses and all orders for this visit:    Type 2 diabetes mellitus with diabetic polyneuropathy, with long-term current use of insulin (CMS/[facility])    Onychomycosis    Chronic venous insufficiency      Comprehensive lower extremity examination and evaluation was performed.  Discussed findings and treatment plan including risks, benefits, and treatment options with patient in detail. Patient agreed with treatment plan.  Patient may maintain nails and calluses at home utilizing emery board or pumice stone between visits as needed  Reviewed at home diabetic foot care including daily foot checks  Advised to wear shoes, even in the home, due to risk of injury/wound related to neuropathy in feet   Advised that given her level of neuropathy that she should not attempt to care for nails at home due to risk for complications including injury or wound.  We will continue to follow every 3 months for diabetic foot and nail care.  An After Visit Summary was printed and given to the patient at discharge, including (if requested) any available informative/educational handouts regarding diagnosis, treatment, or  medications. All questions were answered to patient/family satisfaction. Should symptoms fail to improve or worsen they agree to call or return to clinic or to go to the Emergency Department. Discussed the importance of following up with any needed screening tests/labs/specialist appointments and any requested follow-up recommended by me today. Importance of maintaining follow-up discussed and patient accepts that missed appointments can delay diagnosis and potentially lead to worsening of conditions.  Return in about 3 months (around 11/6/2020)., or sooner if acute issues arise.    Body mass index is 44.06 kg/m².   Patient's Body mass index is 44.06 kg/m². BMI is above normal parameters. Recommendations include: educational material.  Lab Frequency Next Occurrence   Polysomnography 4 or More Parameters Once 08/14/2019   Full Pulmonary Function Test With Bronchodilator Once 04/09/2020   CT Chest Low Dose Wo Once 11/01/2020   CT Chest Low Dose Wo Once 10/16/2020       This document has been electronically signed by YSABEL Hansen on August 7, 2020 08:26

## 2020-11-10 NOTE — PROGRESS NOTES
[facility] - PODIATRY    Today's Date: 11/12/20    Patient Name: Lauren Colón  MRN: [id number]  CSN: [id number]  PCP: Velma Aponte MD  Referring Provider: No ref. provider found    SUBJECTIVE     Chief Complaint   Patient presents with   • Diabetes     Pt PCP Dr. Aponte. Last OV November. unsure of last A1C     HPI: Lauren Colón, a 70 y.o.female, comes to clinic as a(n) established patient complaining of foot pain and complaining of dystrophic nails. Patient has h/o arthritis, cataract, chronic venous insufficiency, COPD, DM, GERD, Gout, Neuropathy, Obesity, Thyroid disease, Sleep apnea. Patient is IDDM and unsure of last BG level. Does not routinely check blood glucose and is unsure of last A1C. Complaining of numbness and tingling in feet and legs that she relates is worse at night. Is not currently on therapy for neuropathy. Relates that toenails have not grown significantly since last visit. Continues to have pain in feet when wearing shoes and caregiver states that she does not routinely wear shoes still. Denies pain at the present time but relates neuropathic pain in legs/feet at night. Relates previous treatment(s) including foot care by podiatry. Former smoker. Denies any constitutional symptoms. No other pedal complaints at this time.    Past Medical History:   Diagnosis Date   • Arthritis    • Cataract    • Cataract    • Chronic venous insufficiency    • COPD, group C, by GOLD 2017 classification (CMS/[facility]) 1/9/2020   • Diabetes mellitus (CMS/[facility])    • Disease of thyroid gland    • GERD without esophagitis 12/30/2019   • Gout    • History of cellulitis    • Leg swelling    • Neuropathy    • Nocturnal hypoxia 12/30/2019   • Obesity hypoventilation syndrome (CMS/HCC) 12/30/2019   • Personal history of nicotine dependence 12/30/2019   • Sleep apnea    • Thyroid disease 12/30/2019     Past Surgical History:   Procedure Laterality Date   • CHOLECYSTECTOMY     • OVARIAN CYST  SURGERY Right    • TUBAL ABDOMINAL LIGATION       Family History   Problem Relation Age of Onset   • Diabetes Mother    • Hearing loss Maternal Grandmother    • Hearing loss Maternal Grandfather    • Cancer Other    • Seizures Other    • Thyroid disease Other    • Tuberculosis Other    • Tuberculosis Father      Social History     Socioeconomic History   • Marital status:      Spouse name: Not on file   • Number of children: Not on file   • Years of education: Not on file   • Highest education level: Not on file   Tobacco Use   • Smoking status: Former Smoker     Packs/day: 3.00     Years: 30.00     Pack years: 90.00     Types: Cigarettes     Quit date:      Years since quittin.8   • Smokeless tobacco: Never Used   • Tobacco comment: 2-3 packs a day for at least 30 years   Substance and Sexual Activity   • Alcohol use: Yes     Comment: rare   • Drug use: No   • Sexual activity: Defer     Allergies   Allergen Reactions   • Tofranil [Imipramine Hcl] Itching     Current Outpatient Medications   Medication Sig Dispense Refill   • aclidinium bromide (Tudorza Pressair) 400 MCG/ACT aerosol powder  powder for inhalation Inhale 1 puff 2 (Two) Times a Day. 60 each 11   • cimetidine (TAGAMET) 300 MG tablet      • citalopram (CeleXA) 20 MG tablet      • clonazePAM (KlonoPIN) 0.5 MG tablet Take 0.5 mg by mouth As Needed.     • donepezil (ARICEPT) 10 MG tablet Take 10 mg by mouth Every Night.     • DROPLET PEN NEEDLES 32G X 5 MM misc      • HumaLOG KwikPen 100 UNIT/ML solution pen-injector      • Lantus SoloStar 100 UNIT/ML injection pen      • levothyroxine (SYNTHROID, LEVOTHROID) 137 MCG tablet Take 137 mcg by mouth Daily.     • loperamide (IMODIUM) 2 MG capsule      • metFORMIN (GLUCOPHAGE) 850 MG tablet Take 850 mg by mouth.     • Multiple Vitamins-Minerals (MULTIVITAMIN PO) Take  by mouth.     • mupirocin (BACTROBAN) 2 % ointment Apply  topically.     • NOVOLOG FLEXPEN 100 UNIT/ML solution pen-injector sc pen       • Nystatin powder      • pantoprazole (PROTONIX) 40 MG EC tablet Take 40 mg by mouth Daily.     • polyethylene glycol (MIRALAX) powder      • simvastatin (ZOCOR) 40 MG tablet Take 40 mg by mouth Every Night.     • TRINTELLIX 10 MG tablet      • TRUE METRIX BLOOD GLUCOSE TEST test strip        No current facility-administered medications for this visit.      Review of Systems   Constitutional: Negative for chills and fever.   HENT: Negative for congestion.    Respiratory: Negative for shortness of breath.    Cardiovascular: Positive for leg swelling. Negative for chest pain.   Gastrointestinal: Negative for constipation, diarrhea, nausea and vomiting.   Musculoskeletal: Positive for arthralgias (foot pain) and gait problem. Negative for myalgias.   Skin: Negative for wound.   Neurological: Positive for numbness.       OBJECTIVE     Vitals:    11/12/20 1347   BP: 138/77   Pulse: (!) 121   Resp: 24   Temp: 97.7 °F (36.5 °C)   SpO2: 93%       PHYSICAL EXAM  GEN:   Accompanied by son.     Foot/Ankle Exam:       General:   Appearance: appears stated age and healthy and obesity    Orientation: AAOx3    Affect: appropriate    Gait: antalgic    Assistance: assistance by care giver    Shoe Gear:  Casual shoes    VASCULAR      Right Foot Vascularity   Dorsalis pedis:  1+  Posterior tibial:  1+  Skin Temperature: warm    Edema Grading:  Trace and non-pitting  CFT:  3  Pedal Hair Growth:  Absent  Varicosities: mild varicosities    Varicosities comment:  Hemosiderin staining     Left Foot Vascularity   Dorsalis pedis:  1+  Posterior tibial:  1+  Skin Temperature: warm    Edema Grading:  Trace and non-pitting  CFT:  3  Pedal Hair Growth:  Absent  Varicosities: mild varicosities    Varicosities comment:  Hemosiderin staining      NEUROLOGIC     Right Foot Neurologic   Light touch sensation:  Diminished  Vibratory sensation:  Diminished  Hot/Cold sensation: diminished    Protective Sensation using Bartley-Shahram Monofilament:   2     Left Foot Neurologic   Light touch sensation:  Diminished  Vibratory sensation:  Diminished  Hot/cold sensation: diminished    Protective Sensation using [place]-Shahram Monofilament:  4     MUSCULOSKELETAL      Right Foot Musculoskeletal    Amputation   Right toes amputated: No    Ecchymosis:  None  Tenderness: none    Arch:  Normal  Hallux valgus: No       Left Foot Musculoskeletal    Amputation   Left toes amputated: No    Ecchymosis:  None  Tenderness: none    Arch:  Normal  Hallux valgus: No       DERMATOLOGIC     Right Foot Dermatologic   Skin: dry skin    Skin: no right foot ulcer    Nails: onychomycosis and dystrophic nails       Left Foot Dermatologic   Skin: dry skin    Skin: no left foot ulcer    Nails: onychomycosis, dystrophic nails and absent (hallux)        RADIOLOGY/NUCLEAR:  No results found.    LABORATORY/CULTURE RESULTS:      PATHOLOGY RESULTS:       ASSESSMENT/PLAN     Diagnoses and all orders for this visit:    1. Onychomycosis (Primary)    2. Type 2 diabetes mellitus with diabetic polyneuropathy, with long-term current use of insulin (CMS/[facility])    3. Chronic venous insufficiency      Comprehensive lower extremity examination and evaluation was performed.  Discussed findings and treatment plan including risks, benefits, and treatment options with patient in detail. Patient agreed with treatment plan.  After verbal consent obtained, nail(s) x4 debrided of length and thickness with nail nipper without incidence  Patient may maintain nails and calluses at home utilizing emery board or pumice stone between visits as needed  Reviewed at home diabetic foot care including daily foot checks  Advised to wear shoes, even in the home, due to risk of injury/wound related to neuropathy in feet   Advised that given her level of neuropathy that she should not attempt to care for nails at home due to risk for complications including injury or wound.  We will continue to follow every 3 months for diabetic  foot and nail care.  Discussed at length with patient and caregiver that patient's nighttime pains are consistent with neuropathy.  I believe the patient would benefit from systemic neuropathic treatment, however, as discussed with her I do not feel comfortable prescribing this given her chronic conditions as well as current other medication use there may be contraindications to this type of therapy and will defer this to her PCP who could better manage medications and possible adverse side effects.  An After Visit Summary was printed and given to the patient at discharge, including (if requested) any available informative/educational handouts regarding diagnosis, treatment, or medications. All questions were answered to patient/family satisfaction. Should symptoms fail to improve or worsen they agree to call or return to clinic or to go to the Emergency Department. Discussed the importance of following up with any needed screening tests/labs/specialist appointments and any requested follow-up recommended by me today. Importance of maintaining follow-up discussed and patient accepts that missed appointments can delay diagnosis and potentially lead to worsening of conditions.  Return in about 3 months (around 2/12/2021)., or sooner if acute issues arise.    Body mass index is 44.82 kg/m².   Patient's Body mass index is 44.82 kg/m². BMI is above normal parameters. Recommendations include: educational material.  Lab Frequency Next Occurrence   Polysomnography 4 or More Parameters Once 08/14/2019   Full Pulmonary Function Test With Bronchodilator Once 04/09/2020   CT Chest Low Dose Wo Once 11/01/2020   CT Chest Low Dose Wo Once 10/16/2020       This document has been electronically signed by YSABEL Hansen on November 12, 2020 14:12 CST

## 2020-11-12 ENCOUNTER — OFFICE VISIT (OUTPATIENT)
Dept: PODIATRY | Facility: CLINIC | Age: 70
End: 2020-11-12

## 2020-11-12 VITALS
DIASTOLIC BLOOD PRESSURE: 77 MMHG | OXYGEN SATURATION: 93 % | HEIGHT: 61 IN | WEIGHT: 237.2 LBS | HEART RATE: 121 BPM | SYSTOLIC BLOOD PRESSURE: 138 MMHG | RESPIRATION RATE: 24 BRPM | TEMPERATURE: 97.7 F | BODY MASS INDEX: 44.78 KG/M2

## 2020-11-12 DIAGNOSIS — Z79.4 TYPE 2 DIABETES MELLITUS WITH DIABETIC POLYNEUROPATHY, WITH LONG-TERM CURRENT USE OF INSULIN (HCC): ICD-10-CM

## 2020-11-12 DIAGNOSIS — B35.1 ONYCHOMYCOSIS: Primary | ICD-10-CM

## 2020-11-12 DIAGNOSIS — I87.2 CHRONIC VENOUS INSUFFICIENCY: ICD-10-CM

## 2020-11-12 DIAGNOSIS — E11.42 TYPE 2 DIABETES MELLITUS WITH DIABETIC POLYNEUROPATHY, WITH LONG-TERM CURRENT USE OF INSULIN (HCC): ICD-10-CM

## 2020-11-12 PROCEDURE — 11720 DEBRIDE NAIL 1-5: CPT | Performed by: NURSE PRACTITIONER

## 2020-11-12 RX ORDER — INSULIN LISPRO 100 [IU]/ML
INJECTION, SOLUTION INTRAVENOUS; SUBCUTANEOUS
COMMUNITY
Start: 2020-09-28

## 2020-11-12 RX ORDER — INSULIN GLARGINE 100 [IU]/ML
INJECTION, SOLUTION SUBCUTANEOUS
COMMUNITY
Start: 2020-09-28

## 2021-02-08 ENCOUNTER — TELEPHONE (OUTPATIENT)
Dept: PODIATRY | Facility: CLINIC | Age: 71
End: 2021-02-08

## 2021-04-16 ENCOUNTER — TELEPHONE (OUTPATIENT)
Dept: PODIATRY | Facility: CLINIC | Age: 71
End: 2021-04-16

## 2021-04-19 ENCOUNTER — TELEPHONE (OUTPATIENT)
Dept: VASCULAR SURGERY | Facility: CLINIC | Age: 71
End: 2021-04-19

## 2021-04-29 ENCOUNTER — TELEPHONE (OUTPATIENT)
Dept: VASCULAR SURGERY | Facility: CLINIC | Age: 71
End: 2021-04-29

## 2021-05-07 ENCOUNTER — TELEPHONE (OUTPATIENT)
Dept: VASCULAR SURGERY | Facility: CLINIC | Age: 71
End: 2021-05-07

## 2021-05-14 ENCOUNTER — OFFICE VISIT (OUTPATIENT)
Dept: PODIATRY | Facility: CLINIC | Age: 71
End: 2021-05-14

## 2021-05-14 VITALS
SYSTOLIC BLOOD PRESSURE: 138 MMHG | BODY MASS INDEX: 47.5 KG/M2 | HEART RATE: 90 BPM | OXYGEN SATURATION: 92 % | HEIGHT: 61 IN | WEIGHT: 251.6 LBS | DIASTOLIC BLOOD PRESSURE: 72 MMHG

## 2021-05-14 DIAGNOSIS — I87.2 CHRONIC VENOUS INSUFFICIENCY: ICD-10-CM

## 2021-05-14 DIAGNOSIS — L60.3 DYSTROPHIC NAIL: ICD-10-CM

## 2021-05-14 DIAGNOSIS — B35.1 ONYCHOMYCOSIS: Primary | ICD-10-CM

## 2021-05-14 DIAGNOSIS — E11.42 TYPE 2 DIABETES MELLITUS WITH DIABETIC POLYNEUROPATHY, WITH LONG-TERM CURRENT USE OF INSULIN (HCC): ICD-10-CM

## 2021-05-14 DIAGNOSIS — Z79.4 TYPE 2 DIABETES MELLITUS WITH DIABETIC POLYNEUROPATHY, WITH LONG-TERM CURRENT USE OF INSULIN (HCC): ICD-10-CM

## 2021-05-14 PROCEDURE — 11720 DEBRIDE NAIL 1-5: CPT | Performed by: NURSE PRACTITIONER

## 2021-05-14 PROCEDURE — 99213 OFFICE O/P EST LOW 20 MIN: CPT | Performed by: NURSE PRACTITIONER

## 2021-05-14 NOTE — PATIENT INSTRUCTIONS
Diabetic Neuropathy  Diabetic neuropathy refers to nerve damage that is caused by diabetes (diabetes mellitus). Over time, people with diabetes can develop nerve damage throughout the body. There are several types of diabetic neuropathy:  · Peripheral neuropathy. This is the most common type of diabetic neuropathy. It causes damage to nerves that carry signals between the spinal cord and other parts of the body (peripheral nerves). This usually affects nerves in the feet and legs first, and may eventually affect the hands and arms. The damage affects the ability to sense touch or temperature.  · Autonomic neuropathy. This type causes damage to nerves that control involuntary functions (autonomic nerves). These nerves carry signals that control:  ? Heartbeat.  ? Body temperature.  ? Blood pressure.  ? Urination.  ? Digestion.  ? Sweating.  ? Sexual function.  ? Response to changing blood sugar (glucose) levels.  · Focal neuropathy. This type of nerve damage affects one area of the body, such as an arm, a leg, or the face. The injury may involve one nerve or a small group of nerves. Focal neuropathy can be painful and unpredictable, and occurs most often in older adults with diabetes. This often develops suddenly, but usually improves over time and does not cause long-term problems.  · Proximal neuropathy. This type of nerve damage affects the nerves of the thighs, hips, buttocks, or legs. It causes severe pain, weakness, and muscle death (atrophy), usually in the thigh muscles. It is more common among older men and people who have type 2 diabetes. The length of recovery time may vary.  What are the causes?  Peripheral, autonomic, and focal neuropathies are caused by diabetes that is not well controlled with treatment. The cause of proximal neuropathy is not known, but it may be caused by inflammation related to uncontrolled blood glucose levels.  What are the signs or symptoms?  Peripheral neuropathy  Peripheral  neuropathy develops slowly over time. When the nerves of the feet and legs no longer work, you may experience:  · Burning, stabbing, or aching pain in the legs or feet.  · Pain or cramping in the legs or feet.  · Loss of feeling (numbness) and inability to feel pressure or pain in the feet. This can lead to:  ? Thick calluses or sores on areas of constant pressure.  ? Ulcers.  ? Reduced ability to feel temperature changes.  · Foot deformities.  · Muscle weakness.  · Loss of balance or coordination.  Autonomic neuropathy  The symptoms of autonomic neuropathy vary depending on which nerves are affected. Symptoms may include:  · Problems with digestion, such as:  ? Nausea or vomiting.  ? Poor appetite.  ? Bloating.  ? Diarrhea or constipation.  ? Trouble swallowing.  ? Losing weight without trying to.  · Problems with the heart, blood and lungs, such as:  ? Dizziness, especially when standing up.  ? Fainting.  ? Shortness of breath.  ? Irregular heartbeat.  · Bladder problems, such as:  ? Trouble starting or stopping urination.  ? Leaking urine.  ? Trouble emptying the bladder.  ? Urinary tract infections (UTIs).  · Problems with other body functions, such as:  ? Sweat. You may sweat too much or too little.  ? Temperature. You might get hot easily. Or, you might feel cold more than usual.  ? Sexual function. Men may not be able to get or maintain an erection. Women may have vaginal dryness and difficulty with arousal.  Focal neuropathy  Symptoms affect only one area of the body. Common symptoms include:  · Numbness.  · Tingling.  · Burning pain.  · Prickling feeling.  · Very sensitive skin.  · Weakness.  · Inability to move (paralysis).  · Muscle twitching.  · Muscles getting smaller (wasting).  · Poor coordination.  · Double or blurred vision.  Proximal neuropathy  · Sudden, severe pain in the hip, thigh, or buttocks. Pain may spread from the back into the legs (sciatica).  · Pain and numbness in the arms and  legs.  · Tingling.  · Loss of bladder control or bowel control.  · Weakness and wasting of thigh muscles.  · Difficulty getting up from a seated position.  · Abdominal swelling.  · Unexplained weight loss.  How is this diagnosed?  Diagnosis usually involves reviewing your medical history and any symptoms you have. Diagnosis varies depending on the type of neuropathy your health care provider suspects.  Peripheral neuropathy  Your health care provider will check areas that are affected by your nervous system (neurologic exam), such as your reflexes, how you move, and what you can feel. You may have other tests, such as:  · Blood tests.  · Removal and examination of fluid that surrounds the spinal cord (lumbar puncture).  · CT scan.  · MRI.  · A test to check the nerves that control muscles (electromyogram, EMG).  · Tests of how quickly messages pass through your nerves (nerve conduction velocity tests).  · Removal of a small piece of nerve to be examined under a microscope (biopsy).  Autonomic neuropathy  You may have tests, such as:  · Tests to measure your blood pressure and heart rate. This may include monitoring you while you are safely secured to an exam table that moves you from a lying position to an upright position (table tilt test).  · Breathing tests to check your lungs.  · Tests to check how food moves through the digestive system (gastric emptying tests).  · Blood, sweat, or urine tests.  · Ultrasound of your bladder.  · Spinal fluid tests.  Focal neuropathy  This condition may be diagnosed with:  · A neurologic exam.  · CT scan.  · MRI.  · EMG.  · Nerve conduction velocity tests.  Proximal neuropathy  There is no test to diagnose this type of neuropathy. You may have tests to rule out other possible causes of this type of neuropathy. Tests may include:  · X-rays of your spine and lumbar region.  · Lumbar puncture.  · MRI.  How is this treated?  The goal of treatment is to keep nerve damage from getting  worse. The most important part of treatment is keeping your blood glucose level and your A1C level within your target range by following your diabetes management plan. Over time, maintaining lower blood glucose levels helps lessen symptoms. In some cases, you may need prescription pain medicine.  Follow these instructions at home:    Lifestyle    · Do not use any products that contain nicotine or tobacco, such as cigarettes and e-cigarettes. If you need help quitting, ask your health care provider.  · Be physically active every day. Include strength training and balance exercises.  · Follow a healthy meal plan.  · Work with your health care provider to manage your blood pressure.  General instructions  · Follow your diabetes management plan as directed.  ? Check your blood glucose levels as directed by your health care provider.  ? Keep your blood glucose in your target range as directed by your health care provider.  ? Have your A1C level checked at least two times a year, or as often as told by your health care provider.  · Take over the counter and prescription medicines only as told by your health care provider. This includes insulin and diabetes medicine.  · Do not drive or use heavy machinery while taking prescription pain medicines.  · Check your skin and feet every day for cuts, bruises, redness, blisters, or sores.  · Keep all follow up visits as told by your health care provider. This is important.  Contact a health care provider if:  · You have burning, stabbing, or aching pain in your legs or feet.  · You are unable to feel pressure or pain in your feet.  · You develop problems with digestion, such as:  ? Nausea.  ? Vomiting.  ? Bloating.  ? Constipation.  ? Diarrhea.  ? Abdominal pain.  · You have difficulty with urination, such as inability:  ? To control when you urinate (incontinence).  ? To completely empty the bladder (retention).  · You have palpitations.  · You feel dizzy, weak, or faint when you  stand up.  Get help right away if:  · You cannot urinate.  · You have sudden weakness or loss of coordination.  · You have trouble speaking.  · You have pain or pressure in your chest.  · You have an irregular heart beat.  · You have sudden inability to move a part of your body.  Summary  · Diabetic neuropathy refers to nerve damage that is caused by diabetes. It can affect nerves throughout the entire body, causing numbness and pain in the arms, legs, digestive tract, heart, and other body systems.  · Keep your blood glucose level and your blood pressure in your target range, as directed by your health care provider. This can help prevent neuropathy from getting worse.  · Check your skin and feet every day for cuts, bruises, redness, blisters, or sores.  · Do not use any products that contain nicotine or tobacco, such as cigarettes and e-cigarettes. If you need help quitting, ask your health care provider.  This information is not intended to replace advice given to you by your health care provider. Make sure you discuss any questions you have with your health care provider.  Document Revised: 01/30/2019 Document Reviewed: 01/22/2018  [employer] Patient Education © 2021 [employer]

## 2021-05-14 NOTE — PROGRESS NOTES
"    Psychiatric - PODIATRY    Today's Date: 05/14/21    Patient Name: Lauren Colón  MRN: [id number]  CSN: [id number]  PCP: Velma Aponte MD  Referring Provider: No ref. provider found    SUBJECTIVE     Chief Complaint   Patient presents with   • Follow-up     pcp02/02/2021 F/u of diabetic foot/nail care- pt states feet have neuropathy- pt pain feet and legs stabbing pain with burning. 10/10- pt presents with flaky skin toe nails are thickened and discolored with irregular growth    • Diabetes     113mg/dl last BG      HPI: Lauren Colón, a 71 y.o.female, comes to clinic as a(n) established patient complaining of foot pain and complaining of dystrophic nails. Patient has h/o arthritis, cataract, chronic venous insufficiency, COPD, DM, GERD, Gout, Neuropathy, Obesity, Thyroid disease, Sleep apnea. Patient is IDDM with last stated BG level of 113mg/dl.  She states that her blood glucose has been \"up-and-down\".  Continues to report significant cramping, numbness, tingling, and burning sensation to both lower extremities and feet.  States that she has not had a follow-up appointment with her PCP since our last discussion and has thus not been started or asked about therapy for neuropathy. Admits pain at 10/10 level and described as burning, aching, sharp, numbness, tingling and cramps. Relates previous treatment(s) including foot care by podiatry. Former smoker. Denies any constitutional symptoms. No other pedal complaints at this time.    Past Medical History:   Diagnosis Date   • Arthritis    • Cataract    • Cataract    • Chronic venous insufficiency    • COPD, group C, by GOLD 2017 classification (CMS/HCC) 1/9/2020   • Diabetes mellitus (CMS/[facility])    • Disease of thyroid gland    • GERD without esophagitis 12/30/2019   • Gout    • History of cellulitis    • Leg swelling    • Neuropathy    • Nocturnal hypoxia 12/30/2019   • Obesity hypoventilation syndrome (CMS/HCC) 12/30/2019   • Personal " history of nicotine dependence 2019   • Sleep apnea    • Thyroid disease 2019     Past Surgical History:   Procedure Laterality Date   • CHOLECYSTECTOMY     • OVARIAN CYST SURGERY Right    • TUBAL ABDOMINAL LIGATION       Family History   Problem Relation Age of Onset   • Diabetes Mother    • Hearing loss Maternal Grandmother    • Hearing loss Maternal Grandfather    • Cancer Other    • Seizures Other    • Thyroid disease Other    • Tuberculosis Other    • Tuberculosis Father      Social History     Socioeconomic History   • Marital status:      Spouse name: Not on file   • Number of children: Not on file   • Years of education: Not on file   • Highest education level: Not on file   Tobacco Use   • Smoking status: Former Smoker     Packs/day: 3.00     Years: 30.00     Pack years: 90.00     Types: Cigarettes     Quit date:      Years since quittin.3   • Smokeless tobacco: Never Used   • Tobacco comment: 2-3 packs a day for at least 30 years   Vaping Use   • Vaping Use: Never used   Substance and Sexual Activity   • Alcohol use: Not Currently     Comment: rare   • Drug use: No   • Sexual activity: Defer     Allergies   Allergen Reactions   • Tofranil [Imipramine Hcl] Itching     Current Outpatient Medications   Medication Sig Dispense Refill   • cimetidine (TAGAMET) 300 MG tablet      • citalopram (CeleXA) 20 MG tablet      • clonazePAM (KlonoPIN) 0.5 MG tablet Take 0.5 mg by mouth As Needed.     • donepezil (ARICEPT) 10 MG tablet Take 10 mg by mouth Every Night.     • DROPLET PEN NEEDLES 32G X 5 MM misc      • HumaLOG KwikPen 100 UNIT/ML solution pen-injector      • Lantus SoloStar 100 UNIT/ML injection pen      • levothyroxine (SYNTHROID, LEVOTHROID) 137 MCG tablet Take 137 mcg by mouth Daily.     • loperamide (IMODIUM) 2 MG capsule      • metFORMIN (GLUCOPHAGE) 850 MG tablet Take 850 mg by mouth.     • Multiple Vitamins-Minerals (MULTIVITAMIN PO) Take  by mouth.     • mupirocin  (BACTROBAN) 2 % ointment Apply  topically.     • NOVOLOG FLEXPEN 100 UNIT/ML solution pen-injector sc pen      • Nystatin powder      • pantoprazole (PROTONIX) 40 MG EC tablet Take 40 mg by mouth Daily.     • polyethylene glycol (MIRALAX) powder      • simvastatin (ZOCOR) 40 MG tablet Take 40 mg by mouth Every Night.     • TRINTELLIX 10 MG tablet      • TRUE METRIX BLOOD GLUCOSE TEST test strip        No current facility-administered medications for this visit.     Review of Systems   Constitutional: Negative for chills and fever.   HENT: Negative for congestion.    Respiratory: Negative for shortness of breath.    Cardiovascular: Positive for leg swelling. Negative for chest pain.   Gastrointestinal: Negative for constipation, diarrhea, nausea and vomiting.   Musculoskeletal: Positive for arthralgias (foot pain) and gait problem. Negative for myalgias.   Skin: Negative for wound.   Neurological: Positive for numbness.       OBJECTIVE     Vitals:    05/14/21 1453   BP: 138/72   Pulse: 90   SpO2: 92%       PHYSICAL EXAM  GEN:   Accompanied by son.     Foot/Ankle Exam:       General:   Diabetic Foot Exam Performed    Appearance: appears stated age and healthy and obesity    Orientation: AAOx3    Affect: appropriate    Gait: antalgic    Assistance: assistance by care giver    Shoe Gear:  Casual shoes    VASCULAR      Right Foot Vascularity   Dorsalis pedis:  1+  Posterior tibial:  1+  Skin Temperature: warm    Edema Grading:  Trace and non-pitting  CFT:  3  Pedal Hair Growth:  Absent  Varicosities: mild varicosities    Varicosities comment:  Hemosiderin staining     Left Foot Vascularity   Dorsalis pedis:  1+  Posterior tibial:  1+  Skin Temperature: warm    Edema Grading:  Trace and non-pitting  CFT:  3  Pedal Hair Growth:  Absent  Varicosities: mild varicosities    Varicosities comment:  Hemosiderin staining      NEUROLOGIC     Right Foot Neurologic   Light touch sensation:  Diminished  Vibratory sensation:   Diminished  Hot/Cold sensation: diminished    Protective Sensation using Wadsworth-Shahram Monofilament:  1     Left Foot Neurologic   Light touch sensation:  Diminished  Vibratory sensation:  Diminished  Hot/cold sensation: diminished    Protective Sensation using Wadsworth-Shahram Monofilament:  1     MUSCULOSKELETAL      Right Foot Musculoskeletal    Amputation   Right toes amputated: No    Ecchymosis:  None  Tenderness comment:  Generalized to foot  Arch:  Normal  Hallux valgus: No       Left Foot Musculoskeletal    Amputation   Left toes amputated: No    Ecchymosis:  None  Tenderness comment:  Generalized to foot  Arch:  Normal  Hallux valgus: No       DERMATOLOGIC     Right Foot Dermatologic   Skin: dry skin    Nails: onychomycosis and dystrophic nails       Left Foot Dermatologic   Skin: dry skin    Nails: onychomycosis, dystrophic nails and absent (hallux)        RADIOLOGY/NUCLEAR:  No results found.    LABORATORY/CULTURE RESULTS:      PATHOLOGY RESULTS:       ASSESSMENT/PLAN     There are no diagnoses linked to this encounter.  Comprehensive lower extremity examination and evaluation was performed.  Discussed findings and treatment plan including risks, benefits, and treatment options with patient in detail. Patient agreed with treatment plan.  After verbal consent obtained, nail(s) x4 debrided of length and thickness with nail nipper without incidence  Patient may maintain nails and calluses at home utilizing emery board or pumice stone between visits as needed  Reviewed at home diabetic foot care including daily foot checks  Advised to wear shoes, even in the home, due to risk of injury/wound related to neuropathy in feet   Once again discussed with patient that I believe that it would be beneficial for her to have systemic therapy for her neuropathy, however, I would defer this to her primary care provider as she is most familiar with her chronic disease processes and has oversight of her other  medications.  She states that she will be seeing her PCP on Monday and asked that I send a copy of the note suggesting this.  I will forward copy of note via [employer] to patient's PCP.  Continue diabetic monitoring and control under direction of PCP.  Given lack of concerning issues with feet and slow no growth will stretch patient visit out to every 6-month  An After Visit Summary was printed and given to the patient at discharge, including (if requested) any available informative/educational handouts regarding diagnosis, treatment, or medications. All questions were answered to patient/family satisfaction. Should symptoms fail to improve or worsen they agree to call or return to clinic or to go to the Emergency Department. Discussed the importance of following up with any needed screening tests/labs/specialist appointments and any requested follow-up recommended by me today. Importance of maintaining follow-up discussed and patient accepts that missed appointments can delay diagnosis and potentially lead to worsening of conditions.  Return in about 6 months (around 11/14/2021)., or sooner if acute issues arise.    Body mass index is 47.54 kg/m².   Patient's Body mass index is 47.54 kg/m². BMI is above normal parameters. Recommendations include: educational material.  Lab Frequency Next Occurrence   Polysomnography 4 or More Parameters Once 08/14/2019   Full Pulmonary Function Test With Bronchodilator Once 04/09/2020   CT Chest Low Dose Wo Once 11/01/2020   CT Chest Low Dose Wo Once 10/16/2020       This document has been electronically signed by YSABEL Hansen on May 14, 2021 16:06 CDT

## 2021-05-17 ENCOUNTER — TELEPHONE (OUTPATIENT)
Dept: PODIATRY | Facility: CLINIC | Age: 71
End: 2021-05-17

## 2021-05-17 NOTE — TELEPHONE ENCOUNTER
Cedric from  office called to confirm some information. Stated the pt was a little confused. Needed office not sent over to them from [place] visit on Friday. I confirmed that in the note it stated it would be sent over. I faxed over the note, with attn to cedric.

## 2021-11-10 ENCOUNTER — TELEPHONE (OUTPATIENT)
Dept: PODIATRY | Facility: CLINIC | Age: 71
End: 2021-11-10

## 2022-06-10 ENCOUNTER — HOSPITAL ENCOUNTER (OUTPATIENT)
Dept: GENERAL RADIOLOGY | Age: 72
Discharge: HOME | End: 2022-06-10
Payer: MEDICARE

## 2022-06-10 ENCOUNTER — OFFICE VISIT (OUTPATIENT)
Dept: PRIMARY CARE CLINIC | Age: 72
End: 2022-06-10
Payer: MEDICARE

## 2022-06-10 VITALS
SYSTOLIC BLOOD PRESSURE: 118 MMHG | HEIGHT: 63 IN | DIASTOLIC BLOOD PRESSURE: 80 MMHG | WEIGHT: 228 LBS | TEMPERATURE: 98.1 F | OXYGEN SATURATION: 90 % | HEART RATE: 89 BPM | BODY MASS INDEX: 40.4 KG/M2

## 2022-06-10 DIAGNOSIS — E11.49 CONTROLLED TYPE 2 DIABETES MELLITUS WITH OTHER NEUROLOGIC COMPLICATION, WITH LONG-TERM CURRENT USE OF INSULIN (HCC): ICD-10-CM

## 2022-06-10 DIAGNOSIS — M25.561 CHRONIC PAIN OF RIGHT KNEE: ICD-10-CM

## 2022-06-10 DIAGNOSIS — G89.29 CHRONIC PAIN OF RIGHT KNEE: ICD-10-CM

## 2022-06-10 DIAGNOSIS — Z79.4 CONTROLLED TYPE 2 DIABETES MELLITUS WITH OTHER NEUROLOGIC COMPLICATION, WITH LONG-TERM CURRENT USE OF INSULIN (HCC): Primary | ICD-10-CM

## 2022-06-10 DIAGNOSIS — L03.116 CELLULITIS OF LEFT LOWER EXTREMITY: ICD-10-CM

## 2022-06-10 DIAGNOSIS — E11.49 CONTROLLED TYPE 2 DIABETES MELLITUS WITH OTHER NEUROLOGIC COMPLICATION, WITH LONG-TERM CURRENT USE OF INSULIN (HCC): Primary | ICD-10-CM

## 2022-06-10 DIAGNOSIS — J41.0 SIMPLE CHRONIC BRONCHITIS (HCC): ICD-10-CM

## 2022-06-10 DIAGNOSIS — I73.9 PVD (PERIPHERAL VASCULAR DISEASE) (HCC): ICD-10-CM

## 2022-06-10 DIAGNOSIS — Z79.4 CONTROLLED TYPE 2 DIABETES MELLITUS WITH OTHER NEUROLOGIC COMPLICATION, WITH LONG-TERM CURRENT USE OF INSULIN (HCC): ICD-10-CM

## 2022-06-10 DIAGNOSIS — K21.9 GASTROESOPHAGEAL REFLUX DISEASE WITHOUT ESOPHAGITIS: ICD-10-CM

## 2022-06-10 DIAGNOSIS — F32.5 MAJOR DEPRESSIVE DISORDER IN FULL REMISSION, UNSPECIFIED WHETHER RECURRENT (HCC): ICD-10-CM

## 2022-06-10 DIAGNOSIS — E78.2 MIXED HYPERLIPIDEMIA: ICD-10-CM

## 2022-06-10 DIAGNOSIS — Z87.891 HISTORY OF TOBACCO ABUSE: ICD-10-CM

## 2022-06-10 LAB
ALBUMIN SERPL-MCNC: 3.8 G/DL (ref 3.5–5.2)
ALP BLD-CCNC: 107 U/L (ref 35–104)
ALT SERPL-CCNC: 14 U/L (ref 5–33)
ANION GAP SERPL CALCULATED.3IONS-SCNC: 15 MMOL/L (ref 7–19)
AST SERPL-CCNC: 19 U/L (ref 5–32)
BASOPHILS ABSOLUTE: 0.1 K/UL (ref 0–0.2)
BASOPHILS RELATIVE PERCENT: 1.1 % (ref 0–1)
BILIRUB SERPL-MCNC: 0.7 MG/DL (ref 0.2–1.2)
BUN BLDV-MCNC: 14 MG/DL (ref 8–23)
CALCIUM SERPL-MCNC: 9.6 MG/DL (ref 8.8–10.2)
CHLORIDE BLD-SCNC: 92 MMOL/L (ref 98–111)
CHOLESTEROL, TOTAL: 113 MG/DL (ref 160–199)
CO2: 31 MMOL/L (ref 22–29)
CREAT SERPL-MCNC: 0.8 MG/DL (ref 0.5–0.9)
CREATININE URINE: 144.7 MG/DL (ref 4.2–622)
EOSINOPHILS ABSOLUTE: 0.2 K/UL (ref 0–0.6)
EOSINOPHILS RELATIVE PERCENT: 3 % (ref 0–5)
GFR AFRICAN AMERICAN: >59
GFR NON-AFRICAN AMERICAN: >60
GLUCOSE BLD-MCNC: 411 MG/DL (ref 74–109)
HBA1C MFR BLD: 8.6 % (ref 4–6)
HCT VFR BLD CALC: 43.6 % (ref 37–47)
HDLC SERPL-MCNC: 48 MG/DL (ref 65–121)
HEMOGLOBIN: 14 G/DL (ref 12–16)
IMMATURE GRANULOCYTES #: 0 K/UL
LDL CHOLESTEROL CALCULATED: 35 MG/DL
LYMPHOCYTES ABSOLUTE: 1.2 K/UL (ref 1.1–4.5)
LYMPHOCYTES RELATIVE PERCENT: 20.7 % (ref 20–40)
MCH RBC QN AUTO: 29.4 PG (ref 27–31)
MCHC RBC AUTO-ENTMCNC: 32.1 G/DL (ref 33–37)
MCV RBC AUTO: 91.6 FL (ref 81–99)
MICROALBUMIN UR-MCNC: 5.7 MG/DL (ref 0–19)
MICROALBUMIN/CREAT UR-RTO: 39.4 MG/G
MONOCYTES ABSOLUTE: 0.4 K/UL (ref 0–0.9)
MONOCYTES RELATIVE PERCENT: 7 % (ref 0–10)
NEUTROPHILS ABSOLUTE: 3.9 K/UL (ref 1.5–7.5)
NEUTROPHILS RELATIVE PERCENT: 68 % (ref 50–65)
PDW BLD-RTO: 13 % (ref 11.5–14.5)
PLATELET # BLD: 125 K/UL (ref 130–400)
PMV BLD AUTO: 11.4 FL (ref 9.4–12.3)
POTASSIUM SERPL-SCNC: 4 MMOL/L (ref 3.5–5)
RBC # BLD: 4.76 M/UL (ref 4.2–5.4)
SODIUM BLD-SCNC: 138 MMOL/L (ref 136–145)
T4 FREE: 1.86 NG/DL (ref 0.93–1.7)
TOTAL PROTEIN: 7.3 G/DL (ref 6.6–8.7)
TRIGL SERPL-MCNC: 152 MG/DL (ref 0–149)
TSH SERPL DL<=0.05 MIU/L-ACNC: 0.88 UIU/ML (ref 0.27–4.2)
WBC # BLD: 5.7 K/UL (ref 4.8–10.8)

## 2022-06-10 PROCEDURE — 99204 OFFICE O/P NEW MOD 45 MIN: CPT | Performed by: NURSE PRACTITIONER

## 2022-06-10 PROCEDURE — 73562 X-RAY EXAM OF KNEE 3: CPT | Performed by: RADIOLOGY

## 2022-06-10 PROCEDURE — 73562 X-RAY EXAM OF KNEE 3: CPT

## 2022-06-10 PROCEDURE — 1123F ACP DISCUSS/DSCN MKR DOCD: CPT | Performed by: NURSE PRACTITIONER

## 2022-06-10 RX ORDER — HUMAN INSULIN 100 [IU]/ML
INJECTION, SUSPENSION SUBCUTANEOUS
COMMUNITY
Start: 2022-04-11 | End: 2022-06-10 | Stop reason: SDUPTHER

## 2022-06-10 RX ORDER — INSULIN DETEMIR 100 [IU]/ML
26 INJECTION, SOLUTION SUBCUTANEOUS 2 TIMES DAILY
Qty: 5 PEN | Refills: 5 | Status: SHIPPED | OUTPATIENT
Start: 2022-06-10 | End: 2022-09-02 | Stop reason: SDUPTHER

## 2022-06-10 RX ORDER — PEN NEEDLE, DIABETIC 32 GX 1/4"
4 NEEDLE, DISPOSABLE MISCELLANEOUS DAILY
Qty: 100 EACH | Refills: 5 | Status: SHIPPED | OUTPATIENT
Start: 2022-06-10

## 2022-06-10 RX ORDER — UMECLIDINIUM BROMIDE AND VILANTEROL TRIFENATATE 62.5; 25 UG/1; UG/1
1 POWDER RESPIRATORY (INHALATION) DAILY
Qty: 1 EACH | Refills: 11 | Status: SHIPPED | OUTPATIENT
Start: 2022-06-10

## 2022-06-10 RX ORDER — ALBUTEROL SULFATE 90 UG/1
2 AEROSOL, METERED RESPIRATORY (INHALATION) 4 TIMES DAILY PRN
Qty: 18 G | Refills: 5 | Status: SHIPPED | OUTPATIENT
Start: 2022-06-10 | End: 2022-07-12 | Stop reason: SDUPTHER

## 2022-06-10 RX ORDER — SIMVASTATIN 40 MG
40 TABLET ORAL NIGHTLY
Qty: 30 TABLET | Refills: 5 | Status: SHIPPED | OUTPATIENT
Start: 2022-06-10 | End: 2022-10-05

## 2022-06-10 RX ORDER — VORTIOXETINE 5 MG/1
TABLET, FILM COATED ORAL
COMMUNITY
Start: 2022-05-24 | End: 2022-06-10 | Stop reason: SDUPTHER

## 2022-06-10 RX ORDER — GABAPENTIN 300 MG/1
CAPSULE ORAL
COMMUNITY
Start: 2022-05-12 | End: 2022-06-10 | Stop reason: SDUPTHER

## 2022-06-10 RX ORDER — VORTIOXETINE 10 MG/1
10 TABLET, FILM COATED ORAL DAILY
Qty: 30 TABLET | Refills: 11 | Status: SHIPPED | OUTPATIENT
Start: 2022-06-10

## 2022-06-10 RX ORDER — VORTIOXETINE 5 MG/1
5 TABLET, FILM COATED ORAL DAILY
Qty: 30 TABLET | Refills: 11 | Status: SHIPPED | OUTPATIENT
Start: 2022-06-10

## 2022-06-10 RX ORDER — PEN NEEDLE, DIABETIC 32 GX 1/4"
NEEDLE, DISPOSABLE MISCELLANEOUS
COMMUNITY
Start: 2022-04-18 | End: 2022-06-10 | Stop reason: SDUPTHER

## 2022-06-10 RX ORDER — INSULIN DETEMIR 100 [IU]/ML
INJECTION, SOLUTION SUBCUTANEOUS
COMMUNITY
Start: 2022-05-04 | End: 2022-06-10 | Stop reason: SDUPTHER

## 2022-06-10 RX ORDER — GABAPENTIN 300 MG/1
300 CAPSULE ORAL NIGHTLY
Qty: 30 CAPSULE | Refills: 5 | Status: SHIPPED | OUTPATIENT
Start: 2022-06-10 | End: 2022-09-27 | Stop reason: SDUPTHER

## 2022-06-10 RX ORDER — HUMAN INSULIN 100 [IU]/ML
14 INJECTION, SUSPENSION SUBCUTANEOUS
Qty: 5 PEN | Refills: 0 | Status: SHIPPED | OUTPATIENT
Start: 2022-06-10 | End: 2022-07-12 | Stop reason: SDUPTHER

## 2022-06-10 RX ORDER — CLINDAMYCIN HYDROCHLORIDE 300 MG/1
300 CAPSULE ORAL 3 TIMES DAILY
Qty: 30 CAPSULE | Refills: 0 | Status: SHIPPED | OUTPATIENT
Start: 2022-06-10 | End: 2022-06-20

## 2022-06-10 RX ORDER — LEVOTHYROXINE SODIUM 137 UG/1
137 TABLET ORAL DAILY
Qty: 30 TABLET | Refills: 11 | Status: SHIPPED | OUTPATIENT
Start: 2022-06-10

## 2022-06-10 RX ORDER — LOPERAMIDE HYDROCHLORIDE 2 MG/1
2 CAPSULE ORAL 4 TIMES DAILY PRN
COMMUNITY

## 2022-06-10 RX ORDER — VORTIOXETINE 10 MG/1
TABLET, FILM COATED ORAL
COMMUNITY
Start: 2022-05-24 | End: 2022-06-10 | Stop reason: SDUPTHER

## 2022-06-10 RX ORDER — PANTOPRAZOLE SODIUM 40 MG/1
TABLET, DELAYED RELEASE ORAL
COMMUNITY
Start: 2022-04-11 | End: 2022-06-10 | Stop reason: SDUPTHER

## 2022-06-10 RX ORDER — PANTOPRAZOLE SODIUM 40 MG/1
40 TABLET, DELAYED RELEASE ORAL DAILY
Qty: 30 TABLET | Refills: 11 | Status: SHIPPED | OUTPATIENT
Start: 2022-06-10

## 2022-06-10 ASSESSMENT — PATIENT HEALTH QUESTIONNAIRE - PHQ9
4. FEELING TIRED OR HAVING LITTLE ENERGY: 1
8. MOVING OR SPEAKING SO SLOWLY THAT OTHER PEOPLE COULD HAVE NOTICED. OR THE OPPOSITE, BEING SO FIGETY OR RESTLESS THAT YOU HAVE BEEN MOVING AROUND A LOT MORE THAN USUAL: 0
SUM OF ALL RESPONSES TO PHQ QUESTIONS 1-9: 2
SUM OF ALL RESPONSES TO PHQ QUESTIONS 1-9: 2
SUM OF ALL RESPONSES TO PHQ9 QUESTIONS 1 & 2: 1
3. TROUBLE FALLING OR STAYING ASLEEP: 0
10. IF YOU CHECKED OFF ANY PROBLEMS, HOW DIFFICULT HAVE THESE PROBLEMS MADE IT FOR YOU TO DO YOUR WORK, TAKE CARE OF THINGS AT HOME, OR GET ALONG WITH OTHER PEOPLE: 0
SUM OF ALL RESPONSES TO PHQ QUESTIONS 1-9: 2
5. POOR APPETITE OR OVEREATING: 0
6. FEELING BAD ABOUT YOURSELF - OR THAT YOU ARE A FAILURE OR HAVE LET YOURSELF OR YOUR FAMILY DOWN: 0
1. LITTLE INTEREST OR PLEASURE IN DOING THINGS: 1
9. THOUGHTS THAT YOU WOULD BE BETTER OFF DEAD, OR OF HURTING YOURSELF: 0
SUM OF ALL RESPONSES TO PHQ QUESTIONS 1-9: 2
7. TROUBLE CONCENTRATING ON THINGS, SUCH AS READING THE NEWSPAPER OR WATCHING TELEVISION: 0
2. FEELING DOWN, DEPRESSED OR HOPELESS: 0

## 2022-06-10 ASSESSMENT — ENCOUNTER SYMPTOMS
RECTAL PAIN: 0
CONSTIPATION: 0
DIARRHEA: 0
ABDOMINAL PAIN: 0
COUGH: 1
WHEEZING: 1
NAUSEA: 0
SHORTNESS OF BREATH: 1
VOMITING: 0

## 2022-06-10 NOTE — PATIENT INSTRUCTIONS
Patient Education        Chronic Pain: Care Instructions  Your Care Instructions     Chronic pain is pain that lasts a long time (months or even years) and may or may not have a clear cause. It is different from acute pain, which usually does have a clear cause--like an injury or illness--and gets better over time. Chronicpain:   Lasts over time but may vary from day to day.  Does not go away despite efforts to end it.  May disrupt your sleep and lead to fatigue.  May cause depression or anxiety.  May make your muscles tense, causing more pain.  Can disrupt your work, hobbies, home life, and relationships with friends and family. Chronic pain is a very real condition. It is not just in your head. Treatment can help and usually includes several methods used together, such as medicines, physical therapy, exercise, and other treatments. Learning how to relax andchanging negative thought patterns can also help you cope. Chronic pain is complex. Taking an active role in your treatment will help you better manage your pain. Tell your doctor if you have trouble dealing with your pain. You may have to try several things before you find what works best foryou. Follow-up care is a key part of your treatment and safety. Be sure to make and go to all appointments, and call your doctor if you are having problems. It's also a good idea to know your test results and keep alist of the medicines you take. How can you care for yourself at home?  Pace yourself. Break up large jobs into smaller tasks. Save harder tasks for days when you have less pain, or go back and forth between hard tasks and easier ones. Take rest breaks.  Relax, and reduce stress. Relaxation techniques such as deep breathing or meditation can help.  Keep moving. Gentle, daily exercise can help reduce pain over the long run. Try low- or no-impact exercises such as walking, swimming, and stationary biking. Do stretches to stay flexible.    Try heat, cold packs, and massage.  Get enough sleep. Chronic pain can make you tired and drain your energy. Talk with your doctor if you have trouble sleeping because of pain.  Think positive. Your thoughts can affect your pain level. Do things that you enjoy to distract yourself when you have pain instead of focusing on the pain. See a movie, read a book, listen to music, or spend time with a friend.  If you think you are depressed, talk to your doctor about treatment.  Keep a daily pain diary. Record how your moods, thoughts, sleep patterns, activities, and medicine affect your pain. You may find that your pain is worse during or after certain activities or when you are feeling a certain emotion. Having a record of your pain can help you and your doctor find the best ways to treat your pain.  Take pain medicines exactly as directed. ? If the doctor gave you a prescription medicine for pain, take it as prescribed. ? If you are not taking a prescription pain medicine, ask your doctor if you can take an over-the-counter medicine. Reducing constipation caused by pain medicine   Talk to your doctor about a laxative. If a laxative doesn't work, your doctor may suggest a prescription medicine.  Include fruits, vegetables, beans, and whole grains in your diet each day. These foods are high in fiber.  If your doctor recommends it, get more exercise. Walking is a good choice. Bit by bit, increase the amount you walk every day. Try for at least 30 minutes on most days of the week.  Schedule time each day for a bowel movement. A daily routine may help. Take your time and do not strain when having a bowel movement. When should you call for help? Call your doctor now or seek immediate medical care if:     Your pain gets worse or is out of control.      You feel down or blue, or you do not enjoy things like you once did. You may be depressed, which is common in people with chronic pain.  Depression can be treated.      You have vomiting or cramps for more than 2 hours. Watch closely for changes in your health, and be sure to contact your doctor if:     You cannot sleep because of pain.      You are very worried or anxious about your pain.      You have trouble taking your pain medicine.      You have any concerns about your pain medicine.      You have trouble with bowel movements, such as:  ? No bowel movement in 3 days. ? Blood in the anal area, in your stool, or on the toilet paper. ? Diarrhea for more than 24 hours. Where can you learn more? Go to https://chpepiceweb.Axcelis [email or web address] org and sign in to your [employer] account. Enter N004 in the [facility] box to learn more about \"Chronic Pain: Care Instructions. \"     If you do not have an account, please click on the \"Sign Up Now\" link. Current as of: December 13, 2021               Content Version: 13.2  © 2006-2022 [employer]arm. Care instructions adapted under license by [facility] ([facility]). If you have questions about a medical condition or this instruction, always ask your healthcare professional. [place] [address] any warranty or liability for your use of this information. Patient Education        Chronic Obstructive Pulmonary Disease (COPD): Care Instructions  Overview     Chronic obstructive pulmonary disease (COPD) is a lung disease that makes it hard to breathe. With COPD, the airways that lead to the lungs are narrowed, and the tiny air sacs in the lungs are damaged and lose their stretch. People with COPD have decreased airflow in and out of the lungs, which makes it hard to breathe. The airways also can get clogged with thick mucus. Cigarettesmoking is a major cause of COPD. Although there is no cure for COPD, you can slow its progress. Following your treatment plan and taking care of yourself can help you feel better and livelonger. Follow-up care is a key part of your treatment and safety. Be sure to make and go to all appointments, and call your doctor if you are having problems. It's also a good idea to know your test results and keep alist of the medicines you take. How can you care for yourself at home? Staying healthy     Do not smoke. This is the most important step you can take to prevent more damage to your lungs. If you need help quitting, talk to your doctor about stop-smoking programs and medicines. These can increase your chances of quitting for good.      Avoid colds and other infections. Get the pneumococcal and whooping cough (pertussis) vaccines. If you have had these vaccines before, ask your doctor if you need another dose. Get the flu vaccine every fall. If you must be around people with colds or the flu, wash your hands often.      Avoid secondhand smoke and air pollution. Try to stay inside with your windows closed when air pollution is bad. Medicines and oxygen therapy     Take your medicines exactly as prescribed. Call your doctor if you think you are having a problem with your medicine. You may be taking medicines such as:  ? Bronchodilators. These help open your airways and make breathing easier. They are either short-acting (work for 4 to 9 hours) or long-acting (work for 12 to 24 hours). You inhale most bronchodilators, so they start to act quickly. Always carry your quick-relief inhaler with you in case you need it. ? Corticosteroids (prednisone, budesonide). These reduce airway inflammation. They come in inhaled or pill form.      Ask your doctor or pharmacist if you are using each type of inhaler correctly. With correct use, the medicine is more likely to get to your lungs.      See if a spacer is right for you. A spacer may also help you get more inhaled medicine to your lungs.  If you use one, ask how to use it properly.      Do not take any vitamins, over-the-counter medicine, or herbal products without talking to your doctor first.      If your doctor prescribed antibiotics, take them as directed. Do not stop taking them just because you feel better. You need to take the full course of antibiotics.      If you use oxygen therapy, use the flow rate your doctor has recommended. Don't change it without talking to your doctor first. Oxygen therapy boosts the amount of oxygen in your blood and helps you breathe easier. Activity     Get regular exercise. Walking is an easy way to get exercise. Start out slowly, and walk a little more each day.      Pay attention to your breathing. You are exercising too hard if you can't talk while you exercise.      Take short rest breaks when doing household chores and other activities.      Learn breathing methods--such as breathing through pursed lips--to help you become less short of breath.      If your doctor has not set you up with a pulmonary rehabilitation program, ask if rehab is right for you. Rehab includes exercise programs, education about your disease and how to manage it, help with diet and other changes, and emotional support. Diet     Eat regular, healthy meals.      Use bronchodilators about 1 hour before you eat to make it easier to eat.      Eat several smaller, frequent meals to prevent getting too full. A full stomach can push on the muscle that helps you breathe (your diaphragm) and make it harder to breathe.      Drink beverages at the end of the meal.      Avoid foods that are hard to chew.      Eat foods that contain protein so you don't lose muscle mass.      Talk with your doctor if you gain too much weight or if you lose weight without trying. Mental health     Talk to your family, friends, or a therapist about your feelings. Some people feel frightened, angry, hopeless, helpless, and even guilty. Talking openly about feelings may help you cope. If these feelings last, talk to your doctor. When should you call for help? Call 911 anytime you think you may need emergency care.  For example, call if:     You have severe trouble breathing.      You are having chest pain that is different or worse than usual.   Call your doctor now or seek immediate medical care if:     You have new or worse trouble breathing.      You cough up blood.      You have a fever.      You have feelings of anxiety or depression. Watch closely for changes in your health, and be sure to contact your doctor if:     You cough more deeply or more often, especially if you notice more mucus or a change in the color of your mucus.      You have new or worse swelling in your legs or belly.      You are not getting better as expected. Where can you learn more? Go to https://[email or web address].[email or web address] org and sign in to your [employer] account. Enter Q577 in the [facility] box to learn more about \"Chronic Obstructive Pulmonary Disease (COPD): Care Instructions. \"     If you do not have an account, please click on the \"Sign Up Now\" link. Current as of: July 6, 2021               Content Version: 13.2  © 2006-2022 [employer]ackerHAND. Care instructions adapted under license by [facility] ([facility]). If you have questions about a medical condition or this instruction, always ask your healthcare professional. [place] [address] any warranty or liability for your use of this information. Patient Education        Chronic Obstructive Pulmonary Disease (COPD) Flare-Ups: Care Instructions  Overview     Chronic obstructive pulmonary disease (COPD) is a lung disease that makes it hard to breathe. It is caused by damage to the lungs over many years, usuallyfrom smoking. Chronic bronchitis and emphysema are two lung problems that are types of COPD:   Chronic bronchitis: The airways that carry air to the lungs (bronchial tubes) get inflamed and make a lot of mucus. This can narrow or block the airways. It can also make you cough.  Emphysema:  The tiny air sacs (alveoli) at the end of the airways in the lungs are damaged. When the air sacs are damaged or destroyed, the inner walls break down, and the sacs become larger. These larger air sacs move less oxygen into the blood. This causes difficulty breathing or shortness of breath that gets worse over time. After air sacs are destroyed, they cannot be replaced. Many people with COPD have attacks called flare-ups or exacerbations. This iswhen your usual symptoms quickly get worse and stay worse. If you notice any problems or new symptoms, get medical treatment right away. Follow-up care is a key part of your treatment and safety. Be sure to make and go to all appointments, and call your doctor if you are having problems. It's also a good idea to know your test results and keep alist of the medicines you take. How can you care for yourself at home?  Be safe with medicines. Take your medicines exactly as prescribed. Call your doctor if you think you are having a problem with your medicine. You may be taking medicines such as:  ? Bronchodilators. These help open your airways and make breathing easier. ? Corticosteroids. These reduce airway inflammation. They may be given as pills, in a vein, or in an inhaled form. You may go home with pills in addition to an inhaler that you already use.  A spacer may help you get more inhaled medicine to your lungs. Ask your doctor or pharmacist if a spacer is right for you. If it is, ask how to use it properly.  If your doctor prescribed antibiotics, take them as directed. Do not stop taking them just because you feel better. You need to take the full course of antibiotics.  If your doctor prescribed oxygen, use the flow rate your doctor has recommended. Do not change it without talking to your doctor first.  [facility] Do not smoke. Smoking makes COPD worse. If you need help quitting, talk to your doctor about stop-smoking programs and medicines. These can increase your chances of quitting for good.   When should you call for help? Call 911 anytime you think you may need emergency care. For example, call if:     You have severe trouble breathing. Call your doctor now or seek immediate medical care if:     You have new or worse trouble breathing.      Your coughing or wheezing gets worse.      You cough up dark brown or bloody mucus (sputum).      You have a new or higher fever.      You have severe chest pain, or chest pain is quickly getting worse. Watch closely for changes in your health, and be sure to contact your doctor if:     You notice more mucus or a change in the color of your mucus.      You need to use your antibiotic or steroid pills.      You do not get better as expected. Where can you learn more? Go to https://AerospikepeCabeoeweb.Free-lance.ru. org and sign in to your [employer] account. Enter H580 in the [facility] box to learn more about \"Chronic Obstructive Pulmonary Disease (COPD) Flare-Ups: Care Instructions. \"     If you do not have an account, please click on the \"Sign Up Now\" link. Current as of: July 6, 2021               Content Version: 13.2  © 8608-9211 [employer]uctionPay. Care instructions adapted under license by [facility] ([facility]). If you have questions about a medical condition or this instruction, always ask your healthcare professional. [place] [address] any warranty or liability for your use of this information. Patient Education        Learning About COPD and How to Prevent Lung Infections  How do lung infections affect COPD? Lung infections like pneumonia and acute bronchitis are common causes of COPD flare-ups. And people who have COPD are more likely to get these lunginfections, especially if they smoke. When you have COPD, it is important to know the symptoms of pneumonia and acutebronchitis and call your doctor if you have them. Symptoms include:   A cough that brings up more mucus than usual.   Fever.  Shortness of breath.   What can you do to prevent these infections? Stay healthy    If you must be around people with colds or the flu, wash your hands often.  Get the flu vaccine every year.  Get a pneumococcal vaccine shot. If you have had one before, ask your doctor whether you need another dose. Two different types of pneumococcal vaccines are recommended for people ages 72 and older.  Make sure you are current on your whooping cough (pertussis) vaccine to help prevent whooping cough.  Do not smoke. This is the most important step you can take to prevent more damage to your lungs. If you need help quitting, talk to your doctor about stop-smoking programs and medicines. These may increase your chances of quitting for good.  Avoid secondhand smoke and air pollution. Try to stay inside with your windows closed when air pollution is bad. Exercise and eat well    If your doctor recommends it, get more exercise. Walking is a good choice. Bit by bit, increase the amount you walk every day. Try for at least 30 minutes on most days of the week.  Eat regular, well-balanced meals. Eating right keeps your energy levels up and helps your body fight infection.  Get plenty of rest and sleep. Follow-up care is a key part of your treatment and safety. Be sure to make and go to all appointments, and call your doctor if you are having problems. It's also a good idea to know your test results and keep alist of the medicines you take. Where can you learn more? Go to https://SqordenmaMoney Toolkit.healthInnovectra. org and sign in to your [employer] account. Enter G904 in the [facility] box to learn more about \"Learning About COPD and How to Prevent Lung Infections. \"     If you do not have an account, please click on the \"Sign Up Now\" link. Current as of: July 6, 2021               Content Version: 13.2  © 1568-5281 Healthwise, Incorporated. Care instructions adapted under license by [facility] ([facility]).  If you have questions about a medical

## 2022-06-10 NOTE — PROGRESS NOTES
Valerie Benoit (:  1950) is a 71 y.o. female,New patient, here for evaluation of the following chief complaint(s):  New Patient (she was unhappy with Dr. Prisca Gibbons at [facility]), Diabetes, Foot Swelling (left foot swelling), Circulatory Problem (patient says her blood flow is not allowing proper flow ), Wound Infection (has sores that are hurting at night), Yeast Infection (under stomach fold and breast), Hemorrhoids (big ones covering her whole anus), Cough (on going cough, causing SOB), Fatigue (has trouble walking and moving around a lot causing her to lose her breath), Headache (has been having a lot of headaches), Spasms (has been having a lot of muscle spasms), and Nutrition Counseling      ASSESSMENT/PLAN:    ICD-10-CM    1. 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 Gastroesophageal reflux disease without esophagitis  K21.9 pantoprazole (PROTONIX) 40 MG tablet  Stable     3. Mixed hyperlipidemia  E78.2 simvastatin (ZOCOR) 40 MG tablet   4. Major depressive disorder in full remission, unspecified whether recurrent ([facility])  F32.5 TRINTELLIX 5 MG tablet     TRINTELLIX 10 MG TABS tablet  At goal no concerns     5. PVD (peripheral vascular disease) ([facility])  I73.9 clindamycin (CLEOCIN) 300 MG capsule   6. Cellulitis of left lower extremity  L03.116 clindamycin (CLEOCIN) 300 MG capsule  Wear support socks     7. Chronic pain of right knee  M25.561 XR KNEE RIGHT (3 VIEWS)    G89.29    8.  Simple chronic bronchitis ([facility])  J41.0 umeclidinium-vilanterol (ANORO ELLIPTA) 62.5-25 MCG/INH AEPB inhaler  Will start daily  Monitor albutoerl use       albuterol sulfate HFA (VENTOLIN HFA) 108 (90 Base) MCG/ACT inhaler   9. History of tobacco abuse  Z87.891 umeclidinium-vilanterol (ANORO ELLIPTA) 62.5-25 MCG/INH AEPB inhaler     albuterol sulfate HFA (VENTOLIN HFA) 108 (90 Base) MCG/ACT inhaler       Return in about 6 weeks (around 7/22/2022) for diabetes and copd and sleep issues 30 minutes. SUBJECTIVE/OBJECTIVE:  HPI    Patient is here to establish care  Had been seeing Dr Portillo Mosher but was concerned about her health   And concerns with taking care of her and not listening to some problems     Patient has diabetes   She reports she has bene diabetic since 39  She was at one time taking metfromin but felt like causing problems  And she stopped it   And noted her hearing coming back  levemir is taking 26 units twice a day   novolin n twice a day 14 units twice a day      Hypothyroidism:  Medication:   synthroid 135mcg   Medication compliance:  compliant most of the time  Patient is  taking her medication consistently on an empty stomach. Symptoms: none.]  Barrier to success:   Laboratory:  No results found for: [facility]  Lab Results   Component Value Date    TSH 0.27 02/26/2016     gerd  On protonix 40mg daily   With tagament as needed    Diarrhea  immodium as needed  For over a year diarrhea off and on   No gallbladder        /80 (Site: Right Upper Arm, Position: Sitting, Cuff Size: Large Adult)   Pulse 89   Temp 98.1 °F (36.7 °C) (Temporal)   Ht 5' 3\" (1.6 m)   Wt 228 lb (103.4 kg)   SpO2 90%   BMI 40.39 kg/m²   Review of Systems   Constitutional: Positive for activity change, appetite change and fatigue. Negative for fever. HENT: Positive for postnasal drip. Negative for congestion. Respiratory: Positive for cough, shortness of breath and wheezing (off and on). Cardiovascular: Positive for leg swelling (compression socks needed). Negative for chest pain and palpitations. Gastrointestinal: Negative for abdominal pain, constipation, diarrhea, nausea, rectal pain and vomiting. Genitourinary: Negative for difficulty urinating. Musculoskeletal: Positive for arthralgias (knee issues). Skin: Positive for wound (left lower leg sore). Neurological: Negative for speech difficulty and headaches. Hematological: Negative for adenopathy. Psychiatric/Behavioral: Negative for behavioral problems and self-injury. The patient is not nervous/anxious and is not hyperactive. Physical Exam  Vitals reviewed. Constitutional:       General: She is not in acute distress. Appearance: Normal appearance. She is obese. She is not ill-appearing. Comments: Mask     HENT:      Head: Normocephalic. Cardiovascular:      Rate and Rhythm: Normal rate and regular rhythm. Heart sounds: No murmur heard. Pulmonary:      Effort: Pulmonary effort is normal.      Breath sounds: Normal breath sounds. No rhonchi (in and out as needed). Skin:     Findings: Lesion (left leg sores redness ) present. No rash. Neurological:      General: No focal deficit present. Mental Status: She is alert and oriented to person, place, and time. Psychiatric:         Mood and Affect: Mood normal.         Behavior: Behavior normal.                   An electronic signature was used to authenticate this note.     --JIA Quintero

## 2022-06-13 ENCOUNTER — TELEPHONE (OUTPATIENT)
Dept: PRIMARY CARE CLINIC | Age: 72
End: 2022-06-13

## 2022-06-13 DIAGNOSIS — M17.11 OSTEOARTHRITIS OF RIGHT KNEE, UNSPECIFIED OSTEOARTHRITIS TYPE: ICD-10-CM

## 2022-06-13 DIAGNOSIS — G89.29 CHRONIC PAIN OF RIGHT KNEE: Primary | ICD-10-CM

## 2022-06-13 DIAGNOSIS — M25.561 CHRONIC PAIN OF RIGHT KNEE: Primary | ICD-10-CM

## 2022-06-13 NOTE — TELEPHONE ENCOUNTER
JIA Fuentes   6/13/2022  8:12 AM CDT Back to Top        Urine noted some mild protein in urine we need to tighten glucose control  Thyroid at goal no changes  Cmp noted electrolytes wnl glucose 411 make sure taking both basal and other insulin twice a day  Kidneys wnl  Keep glucose log and bring to follow up   Cholesterol at goal cont present regimen no changes  A1c 8.6 goal less than 7 keep log and make sure take insulin as instructed   Will need to adjust if elevation continues    JIA Fuentes   6/10/2022  5:59 PM CDT         CBC stable no anemia or concerns             JIA Fuentes   6/12/2022 12:35 PM CDT Back to Top        Knee noted moderate to severe djd and soft tissue swelling as noted on exam refer to dr Carole Leach to evaluate and treat

## 2022-07-05 DIAGNOSIS — E11.49 CONTROLLED TYPE 2 DIABETES MELLITUS WITH OTHER NEUROLOGIC COMPLICATION, WITH LONG-TERM CURRENT USE OF INSULIN (HCC): Primary | ICD-10-CM

## 2022-07-05 DIAGNOSIS — Z79.4 CONTROLLED TYPE 2 DIABETES MELLITUS WITH OTHER NEUROLOGIC COMPLICATION, WITH LONG-TERM CURRENT USE OF INSULIN (HCC): Primary | ICD-10-CM

## 2022-07-05 RX ORDER — DONEPEZIL HYDROCHLORIDE 10 MG/1
TABLET, FILM COATED ORAL
Qty: 30 TABLET | Refills: 6 | Status: SHIPPED | OUTPATIENT
Start: 2022-07-05

## 2022-07-05 NOTE — TELEPHONE ENCOUNTER
Received fax from pharmacy requesting refill on pts medication(s). Pt was last seen in office on 6/10/2022  and has a follow up scheduled for 7/22/2022. Will send request to  Diamond Langston  for authorization.      Requested Prescriptions     Pending Prescriptions Disp Refills    donepezil (ARICEPT) 10 MG tablet [Pharmacy Med Name: DONEPEZIL HCL 10 MG TAB 10 Tablet] 30 tablet 6     Sig: TAKE ONE TABLET BY MOUTH DAILY

## 2022-07-12 DIAGNOSIS — E11.49 CONTROLLED TYPE 2 DIABETES MELLITUS WITH OTHER NEUROLOGIC COMPLICATION, WITH LONG-TERM CURRENT USE OF INSULIN (HCC): ICD-10-CM

## 2022-07-12 DIAGNOSIS — Z87.891 HISTORY OF TOBACCO ABUSE: ICD-10-CM

## 2022-07-12 DIAGNOSIS — Z79.4 CONTROLLED TYPE 2 DIABETES MELLITUS WITH OTHER NEUROLOGIC COMPLICATION, WITH LONG-TERM CURRENT USE OF INSULIN (HCC): ICD-10-CM

## 2022-07-12 DIAGNOSIS — J41.0 SIMPLE CHRONIC BRONCHITIS (HCC): ICD-10-CM

## 2022-07-12 RX ORDER — ALBUTEROL SULFATE 90 UG/1
2 AEROSOL, METERED RESPIRATORY (INHALATION) 4 TIMES DAILY PRN
Qty: 3 EACH | Refills: 3 | Status: SHIPPED | OUTPATIENT
Start: 2022-07-12

## 2022-07-12 RX ORDER — HUMAN INSULIN 100 [IU]/ML
14 INJECTION, SUSPENSION SUBCUTANEOUS
Qty: 13 PEN | Refills: 3 | Status: SHIPPED | OUTPATIENT
Start: 2022-07-12 | End: 2022-09-02 | Stop reason: SDUPTHER

## 2022-07-12 NOTE — TELEPHONE ENCOUNTER
Received fax from pharmacy requesting refill on pts medication(s). Pt was last seen in office on 6/10/2022  and has a follow up scheduled for 7/22/2022. Will send request to  Jerry Harris  for patient.      Requested Prescriptions     Pending Prescriptions Disp Refills    albuterol sulfate HFA (VENTOLIN HFA) 108 (90 Base) MCG/ACT inhaler 3 each 3     Sig: Inhale 2 puffs into the lungs 4 times daily as needed for Wheezing    NOVOLIN N FLEXPEN 100 UNIT/ML injection pen 13 pen 3     Sig: Inject 14 Units into the skin 2 times daily (before meals)

## 2022-09-02 ENCOUNTER — OFFICE VISIT (OUTPATIENT)
Dept: PRIMARY CARE CLINIC | Age: 72
End: 2022-09-02
Payer: MEDICARE

## 2022-09-02 VITALS
TEMPERATURE: 97 F | HEART RATE: 82 BPM | SYSTOLIC BLOOD PRESSURE: 110 MMHG | HEIGHT: 63 IN | OXYGEN SATURATION: 92 % | DIASTOLIC BLOOD PRESSURE: 69 MMHG | WEIGHT: 232 LBS | BODY MASS INDEX: 41.11 KG/M2

## 2022-09-02 DIAGNOSIS — Z79.4 TYPE 2 DIABETES MELLITUS WITH HYPERGLYCEMIA, WITH LONG-TERM CURRENT USE OF INSULIN (HCC): ICD-10-CM

## 2022-09-02 DIAGNOSIS — Z79.4 CONTROLLED TYPE 2 DIABETES MELLITUS WITH OTHER NEUROLOGIC COMPLICATION, WITH LONG-TERM CURRENT USE OF INSULIN (HCC): ICD-10-CM

## 2022-09-02 DIAGNOSIS — Z12.31 ENCOUNTER FOR SCREENING MAMMOGRAM FOR MALIGNANT NEOPLASM OF BREAST: ICD-10-CM

## 2022-09-02 DIAGNOSIS — E11.65 TYPE 2 DIABETES MELLITUS WITH HYPERGLYCEMIA, WITH LONG-TERM CURRENT USE OF INSULIN (HCC): ICD-10-CM

## 2022-09-02 DIAGNOSIS — E11.49 CONTROLLED TYPE 2 DIABETES MELLITUS WITH OTHER NEUROLOGIC COMPLICATION, WITH LONG-TERM CURRENT USE OF INSULIN (HCC): ICD-10-CM

## 2022-09-02 DIAGNOSIS — B37.9 YEAST INFECTION: ICD-10-CM

## 2022-09-02 DIAGNOSIS — Z00.00 INITIAL MEDICARE ANNUAL WELLNESS VISIT: Primary | ICD-10-CM

## 2022-09-02 LAB
ALBUMIN SERPL-MCNC: 3.6 G/DL (ref 3.5–5.2)
ALP BLD-CCNC: 102 U/L (ref 35–104)
ALT SERPL-CCNC: 17 U/L (ref 5–33)
ANION GAP SERPL CALCULATED.3IONS-SCNC: 13 MMOL/L (ref 7–19)
AST SERPL-CCNC: 24 U/L (ref 5–32)
BILIRUB SERPL-MCNC: 0.7 MG/DL (ref 0.2–1.2)
BUN BLDV-MCNC: 14 MG/DL (ref 8–23)
CALCIUM SERPL-MCNC: 9.1 MG/DL (ref 8.8–10.2)
CHLORIDE BLD-SCNC: 99 MMOL/L (ref 98–111)
CO2: 26 MMOL/L (ref 22–29)
CREAT SERPL-MCNC: 1 MG/DL (ref 0.5–0.9)
CREATININE URINE: 253.7 MG/DL (ref 4.2–622)
GFR AFRICAN AMERICAN: >59
GFR NON-AFRICAN AMERICAN: 55
GLUCOSE BLD-MCNC: 328 MG/DL (ref 74–109)
HBA1C MFR BLD: 11.7 % (ref 4–6)
MICROALBUMIN UR-MCNC: 27.6 MG/DL (ref 0–19)
MICROALBUMIN/CREAT UR-RTO: 108.8 MG/G
POTASSIUM SERPL-SCNC: 4 MMOL/L (ref 3.5–5)
SODIUM BLD-SCNC: 138 MMOL/L (ref 136–145)
T4 FREE: 1.53 NG/DL (ref 0.93–1.7)
TOTAL PROTEIN: 7 G/DL (ref 6.6–8.7)
TSH SERPL DL<=0.05 MIU/L-ACNC: 0.27 UIU/ML (ref 0.27–4.2)

## 2022-09-02 PROCEDURE — 1123F ACP DISCUSS/DSCN MKR DOCD: CPT | Performed by: NURSE PRACTITIONER

## 2022-09-02 PROCEDURE — 3052F HG A1C>EQUAL 8.0%<EQUAL 9.0%: CPT | Performed by: NURSE PRACTITIONER

## 2022-09-02 PROCEDURE — G0439 PPPS, SUBSEQ VISIT: HCPCS | Performed by: NURSE PRACTITIONER

## 2022-09-02 RX ORDER — INSULIN DETEMIR 100 [IU]/ML
26 INJECTION, SOLUTION SUBCUTANEOUS 2 TIMES DAILY
Qty: 5 ADJUSTABLE DOSE PRE-FILLED PEN SYRINGE | Refills: 11 | Status: SHIPPED | OUTPATIENT
Start: 2022-09-02

## 2022-09-02 RX ORDER — HUMAN INSULIN 100 [IU]/ML
14 INJECTION, SUSPENSION SUBCUTANEOUS
Qty: 13 ADJUSTABLE DOSE PRE-FILLED PEN SYRINGE | Refills: 3 | Status: SHIPPED | OUTPATIENT
Start: 2022-09-02 | End: 2022-10-14 | Stop reason: SDUPTHER

## 2022-09-02 RX ORDER — FLUCONAZOLE 150 MG/1
150 TABLET ORAL DAILY
Qty: 3 TABLET | Refills: 0 | Status: SHIPPED | OUTPATIENT
Start: 2022-09-02 | End: 2022-09-05

## 2022-09-02 RX ORDER — DIAPER,BRIEF,INFANT-TODD,DISP
EACH MISCELLANEOUS
Qty: 30 G | Refills: 1 | Status: SHIPPED | OUTPATIENT
Start: 2022-09-02 | End: 2022-09-09

## 2022-09-02 RX ORDER — NYSTATIN 100000 [USP'U]/G
POWDER TOPICAL
Qty: 60 G | Refills: 0 | Status: SHIPPED | OUTPATIENT
Start: 2022-09-02

## 2022-09-02 SDOH — ECONOMIC STABILITY: FOOD INSECURITY: WITHIN THE PAST 12 MONTHS, THE FOOD YOU BOUGHT JUST DIDN'T LAST AND YOU DIDN'T HAVE MONEY TO GET MORE.: NEVER TRUE

## 2022-09-02 SDOH — ECONOMIC STABILITY: FOOD INSECURITY: WITHIN THE PAST 12 MONTHS, YOU WORRIED THAT YOUR FOOD WOULD RUN OUT BEFORE YOU GOT MONEY TO BUY MORE.: NEVER TRUE

## 2022-09-02 ASSESSMENT — PATIENT HEALTH QUESTIONNAIRE - PHQ9
5. POOR APPETITE OR OVEREATING: 0
1. LITTLE INTEREST OR PLEASURE IN DOING THINGS: 0
SUM OF ALL RESPONSES TO PHQ9 QUESTIONS 1 & 2: 0
6. FEELING BAD ABOUT YOURSELF - OR THAT YOU ARE A FAILURE OR HAVE LET YOURSELF OR YOUR FAMILY DOWN: 0
2. FEELING DOWN, DEPRESSED OR HOPELESS: 0
SUM OF ALL RESPONSES TO PHQ QUESTIONS 1-9: 0
SUM OF ALL RESPONSES TO PHQ QUESTIONS 1-9: 0
9. THOUGHTS THAT YOU WOULD BE BETTER OFF DEAD, OR OF HURTING YOURSELF: 0
7. TROUBLE CONCENTRATING ON THINGS, SUCH AS READING THE NEWSPAPER OR WATCHING TELEVISION: 0
SUM OF ALL RESPONSES TO PHQ QUESTIONS 1-9: 0
4. FEELING TIRED OR HAVING LITTLE ENERGY: 0
SUM OF ALL RESPONSES TO PHQ QUESTIONS 1-9: 0
10. IF YOU CHECKED OFF ANY PROBLEMS, HOW DIFFICULT HAVE THESE PROBLEMS MADE IT FOR YOU TO DO YOUR WORK, TAKE CARE OF THINGS AT HOME, OR GET ALONG WITH OTHER PEOPLE: 0
8. MOVING OR SPEAKING SO SLOWLY THAT OTHER PEOPLE COULD HAVE NOTICED. OR THE OPPOSITE, BEING SO FIGETY OR RESTLESS THAT YOU HAVE BEEN MOVING AROUND A LOT MORE THAN USUAL: 0
3. TROUBLE FALLING OR STAYING ASLEEP: 0

## 2022-09-02 ASSESSMENT — SOCIAL DETERMINANTS OF HEALTH (SDOH): HOW HARD IS IT FOR YOU TO PAY FOR THE VERY BASICS LIKE FOOD, HOUSING, MEDICAL CARE, AND HEATING?: NOT HARD AT ALL

## 2022-09-02 NOTE — PROGRESS NOTES
Medicare Annual Wellness Visit    Delano Lombardi is here for Medicare AWV (Medicare annual wellness visit./Is having burning and swelling in her lower belly/vaginal area./Blood sugar has been very high, was out of insulin)    Assessment & Plan   Initial Medicare annual wellness visit  Yeast infection  -     fluconazole (DIFLUCAN) 150 MG tablet; Take 1 tablet by mouth daily for 3 days, Disp-3 tablet, R-0Normal  -     nystatin (MYCOSTATIN) 647700 UNIT/GM powder; Apply 3 times daily. , Disp-60 g, R-0, Normal  -     hydrocortisone (ALA-FLORA) 1 % cream; Apply topically 2 times daily. , Disp-30 g, R-1, Normal  Type 2 diabetes mellitus with hyperglycemia, with long-term current use of insulin ([facility])  -      DIABETES FOOT EXAM  -     Comprehensive Metabolic Panel; Future  -     TSH; Future  -     T4, Free; Future  -     Microalbumin / Creatinine Urine Ratio; Future  -     Hemoglobin A1C; Future    Recommendations for Preventive Services Due: see orders and patient instructions/AVS.  Recommended screening schedule for the next 5-10 years is provided to the patient in written form: see Patient Instructions/AVS.     No follow-ups on file. Subjective   The following acute and/or chronic problems were also addressed today:  Sapphire Mcmahon was seen today for medicare awv. Diagnoses and all orders for this visit:    Initial Medicare annual wellness visit    Yeast infection  -     fluconazole (DIFLUCAN) 150 MG tablet; Take 1 tablet by mouth daily for 3 days  -     nystatin (MYCOSTATIN) 212580 UNIT/GM powder; Apply 3 times daily. -     hydrocortisone (ALA-FLORA) 1 % cream; Apply topically 2 times daily. Type 2 diabetes mellitus with hyperglycemia, with long-term current use of insulin ([facility])  [facility] DIABETES FOOT EXAM  -     Comprehensive Metabolic Panel; Future  -     TSH; Future  -     T4, Free; Future  -     Microalbumin / Creatinine Urine Ratio;  Future  -     Hemoglobin A1C; Future        Patient's complete Health Risk Assessment and screening values have been reviewed and are found in Flowsheets. The following problems were reviewed today and where indicated follow up appointments were made and/or referrals ordered.     Positive Risk Factor Screenings with Interventions:    Fall Risk:  Do you feel unsteady or are you worried about falling? : (!) yes  2 or more falls in past year?: no  Fall with injury in past year?: no   Fall Risk Interventions:    Home safety tips provided            General Health and ACP:  General  In general, how would you say your health is?: Good  In the past 7 days, have you experienced any of the following: New or Increased Pain, New or Increased Fatigue, Loneliness, Social Isolation, Stress or Anger?: No  Do you get the social and emotional support that you need?: Yes  Do you have a Living Will?: (!) No    Advance Directives       Power of  Living Will ACP-Advance Directive ACP-Power of     Not on File Not on File Not on File Not on File          General Health Risk Interventions:  No Living Will: Patient declines ACP discussion/assistance    Health Habits/Nutrition:  Physical Activity: Inactive    Days of Exercise per Week: 0 days    Minutes of Exercise per Session: 0 min     Have you lost any weight without trying in the past 3 months?: (!) Yes  Body mass index: (!) 41.09  Have you seen the dentist within the past year?: (!) No  Health Habits/Nutrition Interventions:  Inadequate physical activity:  patient is not ready to increase his/her physical activity level at this time     Safety:  Do you have working smoke detectors?: Yes  Do you have any tripping hazards - loose or unsecured carpets or rugs?: No  Do you have any tripping hazards - clutter in doorways, halls, or stairs?: No  Do you have either shower bars, grab bars, non-slip mats or non-slip surfaces in your shower or bathtub?: (!) No  Do all of your stairways have a railing or banister?: Not Applicable  Do you always fasten your seatbelt when you are in a car?: Yes  Safety Interventions:  Home safety tips provided    ADLs:  In the past 7 days, did you need help from others to perform any of the following everyday activities: Eating, dressing, grooming, bathing, toileting, or walking/balance?: (!) Yes  Select all that apply: (!) Walking/Balance, Bathing  In the past 7 days, did you need help from others to take care of any of the following: Laundry, housekeeping, banking/finances, shopping, telephone use, food preparation, transportation, or taking medications?: (!) Yes  Select all that apply: [employer], Housekeeping, Transportation  ADL Interventions:  Patient declines any further evaluation/treatment for this issue          Objective   Vitals:    09/02/22 1443   BP: 110/69   Site: Right Upper Arm   Position: Sitting   Cuff Size: Large Adult   Pulse: 82   Temp: 97 °F (36.1 °C)   TempSrc: Temporal   SpO2: 92%   Weight: 232 lb (105.2 kg)   Height: 5' 3\" (1.6 m)      Body mass index is 41.1 kg/m².       General Appearance: alert and oriented to person, place and time, well developed and well- nourished, in no acute distress  Skin: warm and dry, no rash or erythema  Head: normocephalic and atraumatic  Eyes: pupils equal, round, and reactive to light, extraocular eye movements intact, conjunctivae normal  ENT: tympanic membrane, external ear and ear canal normal bilaterally, nose without deformity, nasal mucosa and turbinates normal without polyps  Neck: supple and non-tender without mass, no thyromegaly or thyroid nodules, no cervical lymphadenopathy  Pulmonary/Chest: clear to auscultation bilaterally- no wheezes, rales or rhonchi, normal air movement, no respiratory distress  Cardiovascular: normal rate, regular rhythm, normal S1 and S2, no murmurs, rubs, clicks, or gallops, distal pulses intact, no carotid bruits  Abdomen: soft, non-tender, non-distended, normal bowel sounds, no masses or organomegaly skin large hanging over : rash and redness peeling   Extremities: no cyanosis, clubbing or edema  Musculoskeletal: normal range of motion, no joint swelling, deformity or tenderness  Neurologic: reflexes normal and symmetric, no cranial nerve deficit, gait, coordination and speech normal       Allergies   Allergen Reactions    Tofranil [Imipramine]      Prior to Visit Medications    Medication Sig Taking? Authorizing Provider   fluconazole (DIFLUCAN) 150 MG tablet Take 1 tablet by mouth daily for 3 days Yes JIA Levine   nystatin (MYCOSTATIN) 803477 UNIT/GM powder Apply 3 times daily. Yes JIA Levine   hydrocortisone (ALA-FLORA) 1 % cream Apply topically 2 times daily.  Yes JIA Levine   albuterol sulfate HFA (VENTOLIN HFA) 108 (90 Base) MCG/ACT inhaler Inhale 2 puffs into the lungs 4 times daily as needed for Wheezing Yes JIA Levine   NOVOLIN N FLEXPEN 100 UNIT/ML injection pen Inject 14 Units into the skin 2 times daily (before meals) Yes JIA Levine   donepezil (ARICEPT) 10 MG tablet TAKE ONE TABLET BY MOUTH DAILY Yes JIA Goldberg   loperamide (IMODIUM) 2 MG capsule Take 2 mg by mouth 4 times daily as needed for Diarrhea Yes Historical Provider, MD   LEVEMIR FLEXTOUCH 100 UNIT/ML injection pen Inject 26 Units into the skin 2 times daily Yes JIA Levine   pantoprazole (PROTONIX) 40 MG tablet Take 1 tablet by mouth daily Yes JIA Levine   simvastatin (ZOCOR) 40 MG tablet Take 1 tablet by mouth nightly Yes JIA Levine   levothyroxine (SYNTHROID) 137 MCG tablet Take 1 tablet by mouth daily Yes JIA Levine   BD PEN NEEDLE MICRO U/F 32G X 6 MM MISC Inject 4 pens into the skin daily Yes JIA Levine   TRINTELLIX 5 MG tablet Take 1 tablet by mouth daily Yes JIA Levine   TRINTELLIX 10 MG TABS tablet Take 1 tablet by mouth daily Total of 15mg daily Yes JIA Levine   umeclidinium-vilanterol (Colby Buck) 62.5-25 MCG/INH AEPB inhaler Inhale 1 puff into the lungs daily Yes JIA Felix   gabapentin (NEURONTIN) 300 MG capsule Take 1 capsule by mouth at bedtime for 30 days.   JIA Felix       CareTeam (Including outside providers/suppliers regularly involved in providing care):   Patient Care Team:  JIA Felix as PCP - General (Nurse Practitioner Family)  JIA Felix as PCP - [facility] Empaneled Provider     Reviewed and updated this visit:  Tobacco  Allergies  Meds  Problems  Med Hx  Surg Hx  Soc Hx  Fam Hx

## 2022-09-02 NOTE — PATIENT INSTRUCTIONS
Personalized Preventive Plan for Elidia Elliott - 9/2/2022  Medicare offers a range of preventive health benefits. Some of the tests and screenings are paid in full while other may be subject to a deductible, co-insurance, and/or copay. Some of these benefits include a comprehensive review of your medical history including lifestyle, illnesses that may run in your family, and various assessments and screenings as appropriate. After reviewing your medical record and screening and assessments performed today your provider may have ordered immunizations, labs, imaging, and/or referrals for you. A list of these orders (if applicable) as well as your Preventive Care list are included within your After Visit Summary for your review. Other Preventive Recommendations:    A preventive eye exam performed by an eye specialist is recommended every 1-2 years to screen for glaucoma; cataracts, macular degeneration, and other eye disorders. A preventive dental visit is recommended every 6 months. Try to get at least 150 minutes of exercise per week or 10,000 steps per day on a pedometer . Order or download the FREE \"Exercise & Physical Activity: Your Everyday Guide\" from [employer]. Call 5-948.280.6050 or search [employer] online. You need 8185-6382 mg of calcium and 4248-2481 IU of vitamin D per day. It is possible to meet your calcium requirement with diet alone, but a vitamin D supplement is usually necessary to meet this goal.  When exposed to the sun, use a sunscreen that protects against both UVA and UVB radiation with an SPF of 30 or greater. Reapply every 2 to 3 hours or after sweating, drying off with a towel, or swimming. Always wear a seat belt when traveling in a car. Always wear a helmet when riding a bicycle or motorcycle.

## 2022-09-07 ENCOUNTER — TELEPHONE (OUTPATIENT)
Dept: PRIMARY CARE CLINIC | Age: 72
End: 2022-09-07

## 2022-09-07 NOTE — TELEPHONE ENCOUNTER
----- Message from JIA Felix sent at 9/5/2022  5:14 PM CDT -----  Glucose way to high A1c out of control I know was out of medication make a log of glucose and follow up 2 weeks for adjustment for a 30 minute apt   Thyroid over replaced decrease dose by 25mcg less than dose she on and recheck in 4 weeks

## 2022-09-09 RX ORDER — LEVOTHYROXINE SODIUM 112 UG/1
112 TABLET ORAL DAILY
Qty: 30 TABLET | Refills: 1 | Status: SHIPPED | OUTPATIENT
Start: 2022-09-09 | End: 2022-09-27 | Stop reason: SDUPTHER

## 2022-09-09 NOTE — TELEPHONE ENCOUNTER
Received fax from pharmacy requesting refill on pts medication(s). Pt was last seen in office on 9/2/2022  and has a follow up scheduled for 10/14/2022. Will send request to  Clifton Barney  for authorization.      Requested Prescriptions     Pending Prescriptions Disp Refills    levothyroxine (SYNTHROID) 112 MCG tablet 90 tablet 1     Sig: Take 1 tablet by mouth daily

## 2022-09-09 NOTE — TELEPHONE ENCOUNTER
Called patient, spoke with: Patient regarding the results of the patients most recent labs. I advised Patient of Maxine Espana recommendations. Patient did voice understanding      Made pt an appt.  She states her thyroid med was already changed and sent in already

## 2022-09-27 ENCOUNTER — OFFICE VISIT (OUTPATIENT)
Dept: PRIMARY CARE CLINIC | Age: 72
End: 2022-09-27
Payer: MEDICARE

## 2022-09-27 VITALS
BODY MASS INDEX: 41.65 KG/M2 | WEIGHT: 235.13 LBS | OXYGEN SATURATION: 96 % | DIASTOLIC BLOOD PRESSURE: 74 MMHG | HEART RATE: 68 BPM | TEMPERATURE: 97.9 F | SYSTOLIC BLOOD PRESSURE: 126 MMHG

## 2022-09-27 DIAGNOSIS — L01.00 IMPETIGO: ICD-10-CM

## 2022-09-27 DIAGNOSIS — G25.81 RLS (RESTLESS LEGS SYNDROME): ICD-10-CM

## 2022-09-27 DIAGNOSIS — Z79.4 CONTROLLED TYPE 2 DIABETES MELLITUS WITH OTHER NEUROLOGIC COMPLICATION, WITH LONG-TERM CURRENT USE OF INSULIN (HCC): ICD-10-CM

## 2022-09-27 DIAGNOSIS — E03.4 HYPOTHYROIDISM DUE TO ACQUIRED ATROPHY OF THYROID: ICD-10-CM

## 2022-09-27 DIAGNOSIS — E11.49 CONTROLLED TYPE 2 DIABETES MELLITUS WITH OTHER NEUROLOGIC COMPLICATION, WITH LONG-TERM CURRENT USE OF INSULIN (HCC): ICD-10-CM

## 2022-09-27 DIAGNOSIS — E11.65 UNCONTROLLED TYPE 2 DIABETES MELLITUS WITH HYPERGLYCEMIA (HCC): Primary | ICD-10-CM

## 2022-09-27 PROCEDURE — 99214 OFFICE O/P EST MOD 30 MIN: CPT | Performed by: NURSE PRACTITIONER

## 2022-09-27 PROCEDURE — 1123F ACP DISCUSS/DSCN MKR DOCD: CPT | Performed by: NURSE PRACTITIONER

## 2022-09-27 PROCEDURE — 3046F HEMOGLOBIN A1C LEVEL >9.0%: CPT | Performed by: NURSE PRACTITIONER

## 2022-09-27 RX ORDER — GABAPENTIN 300 MG/1
300 CAPSULE ORAL 2 TIMES DAILY
Qty: 60 CAPSULE | Refills: 5 | Status: SHIPPED | OUTPATIENT
Start: 2022-09-27 | End: 2022-10-14 | Stop reason: SDUPTHER

## 2022-09-27 RX ORDER — LEVOTHYROXINE SODIUM 112 UG/1
112 TABLET ORAL DAILY
Qty: 90 TABLET | Refills: 3 | Status: SHIPPED | OUTPATIENT
Start: 2022-09-27

## 2022-09-27 RX ORDER — DULAGLUTIDE 0.75 MG/.5ML
0.75 INJECTION, SOLUTION SUBCUTANEOUS WEEKLY
Qty: 12 ADJUSTABLE DOSE PRE-FILLED PEN SYRINGE | Refills: 3 | Status: SHIPPED | OUTPATIENT
Start: 2022-09-27

## 2022-09-27 ASSESSMENT — ENCOUNTER SYMPTOMS
CONSTIPATION: 0
NAUSEA: 0
DIARRHEA: 0
VOMITING: 0
SHORTNESS OF BREATH: 0
RECTAL PAIN: 0
ABDOMINAL PAIN: 0
COUGH: 0
WHEEZING: 0

## 2022-09-27 NOTE — PROGRESS NOTES
Portillo Michelle (:  1950) is a 71 y.o. female,Established patient, here for evaluation of the following chief complaint(s):  Follow-up (For diabetes), Insomnia (Patient states that she has not been sleeping, says she has not slept the last 4 nights but she is falling asleep during the day, says she has called [facility] about her bed, that is broke, she can feel the springs poking her, that the sides of the mattress are black and [facility] said they could not replace the bed.), Muscle Pain (Is having muscle cramps in her lower legs at night.), Other (Patient is having incontinence at night, she cannot feel when she has to go and it is also getting on her bed. ), Eczema (Her lower legs are dry and flaky and her toe nails are falling off. /), Gastroesophageal Reflux (Patient states that she has reflux in the past and that her stomach is starting to act up.), Wound Infection (Has a scrape on her forehead, she takes the \"crust\" off. She said when the crust comes off there was a pocket of blood she was able to pop and get out. But it keeps getting \"crust\" on it./), and Knee Pain (Is having knee pain at night, she said it only bothers both knees at night, she thinks it is the way she has to lay in the bed.)      ASSESSMENT/PLAN:    ICD-10-CM    1. Uncontrolled type 2 diabetes mellitus with hyperglycemia (HCC)  E11.65 Dulaglutide (TRULICITY) 9.25 TJ/1.0CI SOPN  Will add  Monitor for lows  Reports doing well  At present  Will cont log  Goal less than 10       DME Order for Hospital Bed as OP  Bed failed will need from [facility]        2. Hypothyroidism due to acquired atrophy of thyroid  E03.4 levothyroxine (SYNTHROID) 112 MCG tablet      3. 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  RLS (restless legs syndrome)  G25.81 gabapentin (NEURONTIN) 300 MG capsule  One evening and one at night       DME Order for Hospital Bed as OP          No follow-ups on file. SUBJECTIVE/OBJECTIVE:  HPI  Patient is here for 6 week diabetes follow up for non complaince  At last visit she had not gotten her insulin on automatic refill  And therefore went without \"it \"    Diabetes Mellitus Type 2      Diet compliance:  compliant most of the time  Nutrition Consultation Needed:  no  Medication:   Levemir 26 units bid   Novolog 14 units twice a day  Medication compliance:  compliant all of the time  Weight trend: stable  Current exercise: no  Checkin times daily  Home blood sugar records: fasting range: 220s-240s  Low BG:  no  Eye exam current (within one year): no  Checking Feet regularly:  no  ACE/ARB:  no  Aspirin: Yes  Moderate intensity statin: zocor    Known diabetic complications: peripheral neuropathy and peripheral vascular disease  Barriers to success: time    Tobacco history: She  reports that she quit smoking about 6 years ago. Her smoking use included cigarettes. She has a 12.50 pack-year smoking history.  She has never used smokeless tobacco.    Lab Results   Component Value Date    LABA1C 11.7 (H) 2022    GLUCOSE 328 (H) 2022     Lab Results   Component Value Date    LABMICR 27.60 (H) 2022    CREATININE 1.0 (H) 2022           Abdominal yeast infection  Diflucan and powder   She reports the rash has much improved and left  She is keeping it dry        Bed issues  Reports that \"havent had for 5 years  But notes making her back and legs hurt  And not sleeping well  She reports [facility] had got the mattress  Reports she can feel the springs in the [place] thing\"  Like it is \"broke\"  Along with mold and holes :  She reports they wont even come \"check the warranty \"    Leg spasms  Reports worse recently taking it at night      Forehead non healing sore  Will get cream      /74 (Site: Right Upper Arm, Position: Sitting, Cuff Size: Large Adult) Pulse 68   Temp 97.9 °F (36.6 °C) (Temporal)   Wt 235 lb 2 oz (106.7 kg)   SpO2 96%   BMI 41.65 kg/m²   Review of Systems   Constitutional:  Negative for activity change, appetite change, fatigue and fever. HENT:  Negative for congestion and postnasal drip. Respiratory:  Negative for cough, shortness of breath and wheezing (off and on). Cardiovascular:  Negative for chest pain, palpitations and leg swelling (compression socks needed). Gastrointestinal:  Negative for abdominal pain, constipation, diarrhea, nausea, rectal pain and vomiting. Genitourinary:  Negative for difficulty urinating. Musculoskeletal:  Positive for arthralgias (knee issues). Skin:  Negative for wound (left forehead yellow crusting). Neurological:  Negative for speech difficulty and headaches. Hematological:  Negative for adenopathy. Psychiatric/Behavioral:  Negative for behavioral problems and self-injury. The patient is not nervous/anxious and is not hyperactive. Physical Exam  Vitals reviewed. Constitutional:       General: She is not in acute distress. Appearance: Normal appearance. She is obese. She is not ill-appearing. Comments: Mask     HENT:      Head: Normocephalic. Cardiovascular:      Rate and Rhythm: Normal rate and regular rhythm. Heart sounds: No murmur heard. Pulmonary:      Effort: Pulmonary effort is normal.      Breath sounds: Normal breath sounds. No rhonchi (in and out as needed). Skin:     Findings: No lesion (resolved) or rash. Comments: Forehead : left area     Neurological:      General: No focal deficit present. Mental Status: She is alert and oriented to person, place, and time. Psychiatric:         Mood and Affect: Mood normal.         Behavior: Behavior normal.                 An electronic signature was used to authenticate this note.     --JIA Felix

## 2022-10-04 DIAGNOSIS — E11.65 UNCONTROLLED TYPE 2 DIABETES MELLITUS WITH HYPERGLYCEMIA (HCC): Primary | ICD-10-CM

## 2022-10-04 DIAGNOSIS — E78.2 MIXED HYPERLIPIDEMIA: ICD-10-CM

## 2022-10-05 RX ORDER — PEN NEEDLE, DIABETIC 32GX 5/32"
1 NEEDLE, DISPOSABLE MISCELLANEOUS 4 TIMES DAILY
Qty: 100 EACH | Refills: 10 | Status: SHIPPED | OUTPATIENT
Start: 2022-10-05

## 2022-10-05 RX ORDER — SIMVASTATIN 40 MG
40 TABLET ORAL NIGHTLY
Qty: 30 TABLET | Refills: 10 | Status: SHIPPED | OUTPATIENT
Start: 2022-10-05

## 2022-10-05 NOTE — TELEPHONE ENCOUNTER
Received fax from pharmacy requesting refill on pts medication(s). Pt was last seen in office on 2022  and has a follow up scheduled for 10/14/2022. Will send request to  Sridhar Mast  for authorization.      Requested Prescriptions     Pending Prescriptions Disp Refills    Insulin Pen Needle (SURE COMFORT PEN NEEDLES) 32G X 4 MM MISC [Pharmacy Med Name: SURE COMFORT [id number] PEN 32G X 4 MM Miscellaneous] 100 each 10     Si each by Does not apply route 4 times daily    simvastatin (ZOCOR) 40 MG tablet [Pharmacy Med Name: SIMVASTATIN 40 MG TABLET 40 Tablet] 30 tablet 10     Sig: TAKE 1 TABLET BY MOUTH NIGHTLY

## 2022-10-09 DIAGNOSIS — L01.00 IMPETIGO: ICD-10-CM

## 2022-10-10 NOTE — TELEPHONE ENCOUNTER
Received fax from pharmacy requesting refill on pts medication(s). Pt was last seen in office on 9/27/2022  and has a follow up scheduled for 10/14/2022. Will send request to  Sridhar Mast  for authorization.      Requested Prescriptions     Pending Prescriptions Disp Refills    mupirocin (BACTROBAN) 2 % ointment [Pharmacy Med Name: MUPIROCIN 2% OINT 15GM 2 Ointment] 30 g 10     Sig: APPLY TOPICALLY THREE TIMES A DAY

## 2022-10-14 ENCOUNTER — OFFICE VISIT (OUTPATIENT)
Dept: PRIMARY CARE CLINIC | Age: 72
End: 2022-10-14
Payer: MEDICARE

## 2022-10-14 VITALS
OXYGEN SATURATION: 95 % | DIASTOLIC BLOOD PRESSURE: 84 MMHG | BODY MASS INDEX: 41.72 KG/M2 | SYSTOLIC BLOOD PRESSURE: 130 MMHG | WEIGHT: 235.5 LBS | TEMPERATURE: 97.5 F | HEART RATE: 101 BPM

## 2022-10-14 DIAGNOSIS — G25.81 RLS (RESTLESS LEGS SYNDROME): ICD-10-CM

## 2022-10-14 DIAGNOSIS — E11.49 CONTROLLED TYPE 2 DIABETES MELLITUS WITH OTHER NEUROLOGIC COMPLICATION, WITH LONG-TERM CURRENT USE OF INSULIN (HCC): ICD-10-CM

## 2022-10-14 DIAGNOSIS — L01.00 IMPETIGO: ICD-10-CM

## 2022-10-14 DIAGNOSIS — Z79.4 CONTROLLED TYPE 2 DIABETES MELLITUS WITH OTHER NEUROLOGIC COMPLICATION, WITH LONG-TERM CURRENT USE OF INSULIN (HCC): ICD-10-CM

## 2022-10-14 PROCEDURE — 1123F ACP DISCUSS/DSCN MKR DOCD: CPT | Performed by: NURSE PRACTITIONER

## 2022-10-14 PROCEDURE — 99214 OFFICE O/P EST MOD 30 MIN: CPT | Performed by: NURSE PRACTITIONER

## 2022-10-14 PROCEDURE — 3046F HEMOGLOBIN A1C LEVEL >9.0%: CPT | Performed by: NURSE PRACTITIONER

## 2022-10-14 RX ORDER — GABAPENTIN 300 MG/1
300 CAPSULE ORAL 2 TIMES DAILY
Qty: 60 CAPSULE | Refills: 5 | Status: SHIPPED | OUTPATIENT
Start: 2022-10-14 | End: 2022-11-13

## 2022-10-14 RX ORDER — HUMAN INSULIN 100 [IU]/ML
14 INJECTION, SUSPENSION SUBCUTANEOUS
Qty: 13 ADJUSTABLE DOSE PRE-FILLED PEN SYRINGE | Refills: 3 | Status: SHIPPED | OUTPATIENT
Start: 2022-10-14

## 2022-10-14 ASSESSMENT — ENCOUNTER SYMPTOMS
RECTAL PAIN: 0
VOMITING: 0
DIARRHEA: 0
NAUSEA: 0
WHEEZING: 0
CONSTIPATION: 0
COUGH: 0
ABDOMINAL PAIN: 0
SHORTNESS OF BREATH: 0

## 2022-10-14 NOTE — PROGRESS NOTES
Anne Hardin (:  1950) is a 71 y.o. female,Established patient, here for evaluation of the following chief complaint(s):  Follow-up (For diabetes)      ASSESSMENT/PLAN:    ICD-10-CM    1. Controlled type 2 diabetes mellitus with other neurologic complication, with long-term current use of insulin (HCC)  E11.49 NOVOLIN N FLEXPEN 100 UNIT/ML injection pen  Cont trulicity weekly  Will adjust for a month     Z79.4 gabapentin (NEURONTIN) 300 MG capsule  Will send to local  Will need take evening and bedtime RLS  To help sleep      2. RLS (restless legs syndrome)  G25.81 gabapentin (NEURONTIN) 300 MG capsule      3. Impetigo  L01.00 mupirocin (BACTROBAN) 2 % ointment  Use for 10 days            Return if symptoms worsen or fail to improve. SUBJECTIVE/OBJECTIVE:  HPI    Patient is here for diabetes follow up  She has started the trulicity on saturday night  She reports her glucose has been \"high\"  Taking novolin n needs a refill  Currently this am was 343   She has has been levemir 26 units twice a day      Bed issues   Reports that she got a new mattress   And working on the parts of the bed   She reports it was 200 and cant afford  But the matress at present is doing well    RLS neurontin in evening and night  With relief but has run out  And didn't rest last night    Forehead area  Ordered ointment and not gotten it    /84 (Site: Right Upper Arm, Position: Sitting, Cuff Size: Large Adult)   Pulse (!) 101   Temp 97.5 °F (36.4 °C) (Temporal)   Wt 235 lb 8 oz (106.8 kg)   SpO2 95%   BMI 41.72 kg/m²   Review of Systems   Constitutional:  Negative for activity change, appetite change, fatigue and fever. HENT:  Negative for congestion and postnasal drip. Respiratory:  Negative for cough, shortness of breath and wheezing (off and on). Cardiovascular:  Negative for chest pain, palpitations and leg swelling (compression socks needed).    Gastrointestinal:  Negative for abdominal pain, constipation, diarrhea, nausea, rectal pain and vomiting. Genitourinary:  Negative for difficulty urinating. Musculoskeletal:  Positive for arthralgias (knee issues). Skin:  Negative for wound (left forehead yellow crusting). Neurological:  Negative for speech difficulty and headaches. Hematological:  Negative for adenopathy. Psychiatric/Behavioral:  Negative for behavioral problems and self-injury. The patient is not nervous/anxious and is not hyperactive. Physical Exam  Vitals reviewed. Constitutional:       General: She is not in acute distress. Appearance: Normal appearance. She is obese. She is not ill-appearing. Comments: Mask     HENT:      Head: Normocephalic. Cardiovascular:      Rate and Rhythm: Normal rate and regular rhythm. Heart sounds: No murmur heard. Pulmonary:      Effort: Pulmonary effort is normal.      Breath sounds: Normal breath sounds. No rhonchi (in and out as needed). Skin:     Findings: No lesion (resolved) or rash. Comments: Forehead : left area     Neurological:      General: No focal deficit present. Mental Status: She is alert and oriented to person, place, and time. Psychiatric:         Mood and Affect: Mood normal.         Behavior: Behavior normal.                 An electronic signature was used to authenticate this note.     --JIA Mills

## 2022-11-09 DIAGNOSIS — E11.49 CONTROLLED TYPE 2 DIABETES MELLITUS WITH OTHER NEUROLOGIC COMPLICATION, WITH LONG-TERM CURRENT USE OF INSULIN (HCC): ICD-10-CM

## 2022-11-09 DIAGNOSIS — E78.2 MIXED HYPERLIPIDEMIA: ICD-10-CM

## 2022-11-09 DIAGNOSIS — Z79.4 CONTROLLED TYPE 2 DIABETES MELLITUS WITH OTHER NEUROLOGIC COMPLICATION, WITH LONG-TERM CURRENT USE OF INSULIN (HCC): ICD-10-CM

## 2022-11-09 DIAGNOSIS — E11.65 UNCONTROLLED TYPE 2 DIABETES MELLITUS WITH HYPERGLYCEMIA (HCC): ICD-10-CM

## 2022-11-09 DIAGNOSIS — E03.4 HYPOTHYROIDISM DUE TO ACQUIRED ATROPHY OF THYROID: ICD-10-CM

## 2022-11-09 DIAGNOSIS — Z79.4 CONTROLLED TYPE 2 DIABETES MELLITUS WITH OTHER NEUROLOGIC COMPLICATION, WITH LONG-TERM CURRENT USE OF INSULIN (HCC): Primary | ICD-10-CM

## 2022-11-09 DIAGNOSIS — E11.49 CONTROLLED TYPE 2 DIABETES MELLITUS WITH OTHER NEUROLOGIC COMPLICATION, WITH LONG-TERM CURRENT USE OF INSULIN (HCC): Primary | ICD-10-CM

## 2022-11-09 LAB
ALBUMIN SERPL-MCNC: 3.7 G/DL (ref 3.5–5.2)
ALP BLD-CCNC: 95 U/L (ref 35–104)
ALT SERPL-CCNC: 18 U/L (ref 5–33)
ANION GAP SERPL CALCULATED.3IONS-SCNC: 10 MMOL/L (ref 7–19)
AST SERPL-CCNC: 26 U/L (ref 5–32)
BASOPHILS ABSOLUTE: 0 K/UL (ref 0–0.2)
BASOPHILS RELATIVE PERCENT: 0.9 % (ref 0–1)
BILIRUB SERPL-MCNC: 0.6 MG/DL (ref 0.2–1.2)
BUN BLDV-MCNC: 16 MG/DL (ref 8–23)
CALCIUM SERPL-MCNC: 9.4 MG/DL (ref 8.8–10.2)
CHLORIDE BLD-SCNC: 99 MMOL/L (ref 98–111)
CHOLESTEROL, TOTAL: 134 MG/DL (ref 160–199)
CO2: 29 MMOL/L (ref 22–29)
CREAT SERPL-MCNC: 1 MG/DL (ref 0.5–0.9)
EOSINOPHILS ABSOLUTE: 0.1 K/UL (ref 0–0.6)
EOSINOPHILS RELATIVE PERCENT: 3.7 % (ref 0–5)
GFR SERPL CREATININE-BSD FRML MDRD: >60 ML/MIN/{1.73_M2}
GLUCOSE BLD-MCNC: 269 MG/DL (ref 74–109)
HBA1C MFR BLD: 11.1 % (ref 4–6)
HCT VFR BLD CALC: 42.8 % (ref 37–47)
HDLC SERPL-MCNC: 55 MG/DL (ref 65–121)
HEMOGLOBIN: 13.5 G/DL (ref 12–16)
IMMATURE GRANULOCYTES #: 0 K/UL
LDL CHOLESTEROL CALCULATED: 52 MG/DL
LYMPHOCYTES ABSOLUTE: 0.9 K/UL (ref 1.1–4.5)
LYMPHOCYTES RELATIVE PERCENT: 25 % (ref 20–40)
MCH RBC QN AUTO: 30 PG (ref 27–31)
MCHC RBC AUTO-ENTMCNC: 31.5 G/DL (ref 33–37)
MCV RBC AUTO: 95.1 FL (ref 81–99)
MONOCYTES ABSOLUTE: 0.3 K/UL (ref 0–0.9)
MONOCYTES RELATIVE PERCENT: 7.4 % (ref 0–10)
NEUTROPHILS ABSOLUTE: 2.2 K/UL (ref 1.5–7.5)
NEUTROPHILS RELATIVE PERCENT: 62.7 % (ref 50–65)
PDW BLD-RTO: 12.5 % (ref 11.5–14.5)
PLATELET # BLD: 87 K/UL (ref 130–400)
PMV BLD AUTO: 11.7 FL (ref 9.4–12.3)
POTASSIUM SERPL-SCNC: 3.9 MMOL/L (ref 3.5–5)
RBC # BLD: 4.5 M/UL (ref 4.2–5.4)
SODIUM BLD-SCNC: 138 MMOL/L (ref 136–145)
T4 FREE: 1.33 NG/DL (ref 0.93–1.7)
TOTAL PROTEIN: 6.7 G/DL (ref 6.6–8.7)
TRIGL SERPL-MCNC: 133 MG/DL (ref 0–149)
TSH SERPL DL<=0.05 MIU/L-ACNC: 0.65 UIU/ML (ref 0.27–4.2)
WBC # BLD: 3.5 K/UL (ref 4.8–10.8)

## 2022-11-10 ENCOUNTER — TELEPHONE (OUTPATIENT)
Dept: PRIMARY CARE CLINIC | Age: 72
End: 2022-11-10

## 2022-11-10 NOTE — TELEPHONE ENCOUNTER
----- Message from Gerardine Babinski, APRN sent at 11/9/2022  8:40 PM CST -----  Glucose not controlled bring log to follow

## 2022-11-10 NOTE — TELEPHONE ENCOUNTER
----- Message from JIA Brooks sent at 11/9/2022  3:50 PM CST -----  Thyroid improved cont present dose  Lipids at goal no concerns  Cmp noted glucose better than before cont to goal of less than 150  Kidneys and liver wnl  CBC normal no anemia or concerns

## 2022-11-10 NOTE — TELEPHONE ENCOUNTER
Tried calling pt back to discuss results. Called twice and I could hear them perfectly. Apparently they can't hear me. Will see if they call back for results.

## 2022-11-11 NOTE — TELEPHONE ENCOUNTER
Called patient, spoke with: Patient regarding the results of the patients most recent labs. I advised Patient of Jennifer Grande recommendations.    Patient did voice understanding

## 2022-12-09 ENCOUNTER — OFFICE VISIT (OUTPATIENT)
Dept: PRIMARY CARE CLINIC | Age: 72
End: 2022-12-09
Payer: MEDICARE

## 2022-12-09 VITALS
BODY MASS INDEX: 42.25 KG/M2 | WEIGHT: 238.5 LBS | OXYGEN SATURATION: 93 % | DIASTOLIC BLOOD PRESSURE: 76 MMHG | HEART RATE: 109 BPM | TEMPERATURE: 98 F | SYSTOLIC BLOOD PRESSURE: 122 MMHG

## 2022-12-09 DIAGNOSIS — G25.81 RLS (RESTLESS LEGS SYNDROME): ICD-10-CM

## 2022-12-09 DIAGNOSIS — H65.22 CHRONIC SEROUS OTITIS MEDIA OF LEFT EAR: ICD-10-CM

## 2022-12-09 DIAGNOSIS — Z79.4 CONTROLLED TYPE 2 DIABETES MELLITUS WITH OTHER NEUROLOGIC COMPLICATION, WITH LONG-TERM CURRENT USE OF INSULIN (HCC): Primary | ICD-10-CM

## 2022-12-09 DIAGNOSIS — M54.50 LOW BACK PAIN, UNSPECIFIED BACK PAIN LATERALITY, UNSPECIFIED CHRONICITY, UNSPECIFIED WHETHER SCIATICA PRESENT: ICD-10-CM

## 2022-12-09 DIAGNOSIS — E11.42 DIABETIC POLYNEUROPATHY ASSOCIATED WITH TYPE 2 DIABETES MELLITUS (HCC): ICD-10-CM

## 2022-12-09 DIAGNOSIS — H91.93 BILATERAL HEARING LOSS, UNSPECIFIED HEARING LOSS TYPE: ICD-10-CM

## 2022-12-09 DIAGNOSIS — M54.50 ACUTE LOW BACK PAIN, UNSPECIFIED BACK PAIN LATERALITY, UNSPECIFIED WHETHER SCIATICA PRESENT: ICD-10-CM

## 2022-12-09 DIAGNOSIS — E11.49 CONTROLLED TYPE 2 DIABETES MELLITUS WITH OTHER NEUROLOGIC COMPLICATION, WITH LONG-TERM CURRENT USE OF INSULIN (HCC): Primary | ICD-10-CM

## 2022-12-09 LAB
CHP ED QC CHECK: NORMAL
GLUCOSE BLD-MCNC: 304 MG/DL

## 2022-12-09 PROCEDURE — 82962 GLUCOSE BLOOD TEST: CPT | Performed by: NURSE PRACTITIONER

## 2022-12-09 PROCEDURE — 1123F ACP DISCUSS/DSCN MKR DOCD: CPT | Performed by: NURSE PRACTITIONER

## 2022-12-09 PROCEDURE — 99214 OFFICE O/P EST MOD 30 MIN: CPT | Performed by: NURSE PRACTITIONER

## 2022-12-09 PROCEDURE — 3046F HEMOGLOBIN A1C LEVEL >9.0%: CPT | Performed by: NURSE PRACTITIONER

## 2022-12-09 RX ORDER — GABAPENTIN 300 MG/1
300 CAPSULE ORAL 2 TIMES DAILY
Qty: 60 CAPSULE | Refills: 5 | Status: SHIPPED | OUTPATIENT
Start: 2022-12-09 | End: 2023-01-08

## 2022-12-09 RX ORDER — TRAMADOL HYDROCHLORIDE 50 MG/1
50 TABLET ORAL EVERY 4 HOURS PRN
Qty: 18 TABLET | Refills: 0 | Status: SHIPPED | OUTPATIENT
Start: 2022-12-09 | End: 2022-12-12

## 2022-12-09 RX ORDER — GABAPENTIN 600 MG/1
600 TABLET ORAL NIGHTLY
Qty: 30 TABLET | Refills: 5 | Status: SHIPPED | OUTPATIENT
Start: 2022-12-09 | End: 2023-01-08

## 2022-12-09 ASSESSMENT — ENCOUNTER SYMPTOMS
SINUS PRESSURE: 0
ABDOMINAL PAIN: 0
TROUBLE SWALLOWING: 0
COUGH: 0
WHEEZING: 0
BACK PAIN: 1
SORE THROAT: 0
SHORTNESS OF BREATH: 1

## 2022-12-09 NOTE — PROGRESS NOTES
Mariana Matthews (:  1950) is a 71 y.o. female,Established patient, here for evaluation of the following chief complaint(s):  Follow-up (For diabetes) and Otalgia (Itching in ears )      ASSESSMENT/PLAN:    ICD-10-CM    1. 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 RLS (restless legs syndrome)  G25.81 gabapentin (NEURONTIN) 600 MG tablet     gabapentin (NEURONTIN) 300 MG capsule      3. Low back pain, unspecified back pain laterality, unspecified chronicity, unspecified whether sciatica present  M54.50 gabapentin (NEURONTIN) 600 MG tablet  Start neurontin at night   Cont 300mg twice a day during day      4. Diabetic polyneuropathy associated with type 2 diabetes mellitus (HCC)  E11.42 gabapentin (NEURONTIN) 600 MG tablet      5. Acute low back pain, unspecified back pain laterality, unspecified whether sciatica present  M54.50 traMADol (ULTRAM) 50 MG tablet  Use rarely   Will try when severe        6. Bilateral hearing loss, unspecified hearing loss type  H91.93 [address], PA-C, Otolaryngology, [place]      7. Chronic serous otitis media of left ear  [address], [place], Otolaryngology, [place]  History of PE tube  Evaluate and treat for hearing and needs             Return in about 1 month (around 1/10/2023) for diabetes follow up 1 month . SUBJECTIVE/OBJECTIVE:  HPI    Patient is here for diabetes follow up    Diabetes Mellitus Type 2      Diet compliance:  compliant all of the time  Nutrition Consultation Needed:  no  Medication:   Novolog 14 units twice a day  Trulicity . 75mg weekly   Levemir 26 units twice a day   Medication compliance:  compliant all of the time  Weight trend: stable  Current exercise: no  Checking: 3 times daily  Home blood sugar records:  fasting 120s-160s  afternoon and bedtims 300s-400s  Low BG:  no  Eye exam current (within one year): no  Checking Feet regularly:  yes - no issues  ACE/ARB:  no  Aspirin: Yes  Moderate intensity statin: stable    Known diabetic complications: cardiovascular disease  Barriers to success: none  Tobacco history: She  reports that she quit smoking about 6 years ago. Her smoking use included cigarettes. She has a 12.50 pack-year smoking history. She has never used smokeless tobacco.    Lab Results   Component Value Date    LABA1C 11.1 (H) 11/09/2022    GLUCOSE 304 12/09/2022     Lab Results   Component Value Date    LABMICR 27.60 (H) 09/02/2022    CREATININE 1.0 (H) 11/09/2022       Leg pain  Reports has been taking twice a day  Notes still having this and \"all through body \"  She reports she gets some relief   Denies sleepy   Problems sleeping  Due to sleep issues    Reports that having some back pain  Due to weather change   She took to a pain pill of nephews   And it helped her when took it     DOMINIC was reviewed today per office protocol. Report shows No discrepancies. Fill pattern is consistent from single provider(s) at single pharmacy(s). Hearing loss and ear itching   Wants to get hearing aids  Or treatment  At times can and other times cant        /76 (Site: Right Upper Arm, Position: Sitting, Cuff Size: Large Adult)   Pulse (!) 109   Temp 98 °F (36.7 °C) (Temporal)   Wt 238 lb 8 oz (108.2 kg)   SpO2 93%   BMI 42.25 kg/m²   Review of Systems   Constitutional:  Positive for activity change and fatigue. Negative for appetite change and fever. HENT:  Positive for congestion and ear pain. Negative for sinus pressure, sore throat and trouble swallowing. Hearing change   Respiratory:  Positive for shortness of breath. Negative for cough and wheezing.     Cardiovascular:  Negative for chest pain, palpitations and leg swelling. Gastrointestinal:  Negative for abdominal pain. Genitourinary:  Negative for difficulty urinating. Musculoskeletal:  Positive for back pain and myalgias. Negative for arthralgias. Neurological:  Negative for dizziness, syncope, weakness and headaches. Bilateral leg pain worse at night     Hematological:  Negative for adenopathy. Psychiatric/Behavioral:  Positive for sleep disturbance (due to pain). Negative for behavioral problems, self-injury and suicidal ideas. Physical Exam  Vitals reviewed. Constitutional:       General: She is not in acute distress. Appearance: Normal appearance. She is obese. She is not ill-appearing. HENT:      Right Ear: No middle ear effusion. There is no impacted cerumen. Left Ear: A middle ear effusion is present. Ears:      Comments: Hard of hearing    Cardiovascular:      Rate and Rhythm: Normal rate and regular rhythm. Heart sounds: No murmur heard. Pulmonary:      Effort: Pulmonary effort is normal.      Breath sounds: Normal breath sounds. Abdominal:      General: Bowel sounds are normal.   Musculoskeletal:         General: Tenderness (lower back  and thorsaic) present. Skin:     Findings: No rash. Neurological:      General: No focal deficit present. Mental Status: She is alert and oriented to person, place, and time. Psychiatric:         Mood and Affect: Mood normal.         Behavior: Behavior normal.               An electronic signature was used to authenticate this note.     --JIA Gomez

## 2022-12-29 DIAGNOSIS — E11.49 CONTROLLED TYPE 2 DIABETES MELLITUS WITH OTHER NEUROLOGIC COMPLICATION, WITH LONG-TERM CURRENT USE OF INSULIN (HCC): ICD-10-CM

## 2022-12-29 DIAGNOSIS — Z79.4 CONTROLLED TYPE 2 DIABETES MELLITUS WITH OTHER NEUROLOGIC COMPLICATION, WITH LONG-TERM CURRENT USE OF INSULIN (HCC): ICD-10-CM

## 2022-12-29 RX ORDER — DONEPEZIL HYDROCHLORIDE 10 MG/1
10 TABLET, FILM COATED ORAL DAILY
Qty: 30 TABLET | Refills: 6 | Status: SHIPPED | OUTPATIENT
Start: 2022-12-29

## 2022-12-29 NOTE — TELEPHONE ENCOUNTER
Received fax from pharmacy requesting refill on pts medication(s). Pt was last seen in office on 12/9/2022  and has a follow up scheduled for 1/10/2023. Will send request to  Sridhar Mast  for authorization.      Requested Prescriptions     Pending Prescriptions Disp Refills    donepezil (ARICEPT) 10 MG tablet 30 tablet 6     Sig: Take 1 tablet by mouth Daily

## 2023-01-10 ENCOUNTER — OFFICE VISIT (OUTPATIENT)
Dept: PRIMARY CARE CLINIC | Age: 73
End: 2023-01-10
Payer: MEDICARE

## 2023-01-10 VITALS
WEIGHT: 245.5 LBS | OXYGEN SATURATION: 91 % | SYSTOLIC BLOOD PRESSURE: 126 MMHG | DIASTOLIC BLOOD PRESSURE: 84 MMHG | HEART RATE: 84 BPM | BODY MASS INDEX: 43.49 KG/M2 | TEMPERATURE: 98.4 F

## 2023-01-10 DIAGNOSIS — Z79.4 CONTROLLED TYPE 2 DIABETES MELLITUS WITH OTHER NEUROLOGIC COMPLICATION, WITH LONG-TERM CURRENT USE OF INSULIN (HCC): ICD-10-CM

## 2023-01-10 DIAGNOSIS — E11.49 CONTROLLED TYPE 2 DIABETES MELLITUS WITH OTHER NEUROLOGIC COMPLICATION, WITH LONG-TERM CURRENT USE OF INSULIN (HCC): ICD-10-CM

## 2023-01-10 DIAGNOSIS — E11.42 DIABETIC POLYNEUROPATHY ASSOCIATED WITH TYPE 2 DIABETES MELLITUS (HCC): ICD-10-CM

## 2023-01-10 DIAGNOSIS — M54.50 ACUTE LOW BACK PAIN, UNSPECIFIED BACK PAIN LATERALITY, UNSPECIFIED WHETHER SCIATICA PRESENT: ICD-10-CM

## 2023-01-10 DIAGNOSIS — G25.81 RLS (RESTLESS LEGS SYNDROME): ICD-10-CM

## 2023-01-10 DIAGNOSIS — M54.50 LOW BACK PAIN, UNSPECIFIED BACK PAIN LATERALITY, UNSPECIFIED CHRONICITY, UNSPECIFIED WHETHER SCIATICA PRESENT: ICD-10-CM

## 2023-01-10 PROCEDURE — 1123F ACP DISCUSS/DSCN MKR DOCD: CPT | Performed by: NURSE PRACTITIONER

## 2023-01-10 PROCEDURE — 99214 OFFICE O/P EST MOD 30 MIN: CPT | Performed by: NURSE PRACTITIONER

## 2023-01-10 RX ORDER — TRAMADOL HYDROCHLORIDE 50 MG/1
50 TABLET ORAL EVERY 4 HOURS PRN
Qty: 18 TABLET | Refills: 0 | Status: SHIPPED | OUTPATIENT
Start: 2023-01-10 | End: 2023-01-13

## 2023-01-10 RX ORDER — GABAPENTIN 600 MG/1
600 TABLET ORAL NIGHTLY
Qty: 30 TABLET | Refills: 5 | Status: SHIPPED | OUTPATIENT
Start: 2023-01-10 | End: 2023-02-09

## 2023-01-10 RX ORDER — INSULIN DETEMIR 100 [IU]/ML
30 INJECTION, SOLUTION SUBCUTANEOUS 2 TIMES DAILY
Qty: 5 ADJUSTABLE DOSE PRE-FILLED PEN SYRINGE | Refills: 11 | Status: SHIPPED | OUTPATIENT
Start: 2023-01-10

## 2023-01-10 RX ORDER — HUMAN INSULIN 100 [IU]/ML
14 INJECTION, SUSPENSION SUBCUTANEOUS 3 TIMES DAILY
Qty: 13 ADJUSTABLE DOSE PRE-FILLED PEN SYRINGE | Refills: 3 | Status: SHIPPED | OUTPATIENT
Start: 2023-01-10

## 2023-01-10 RX ORDER — GABAPENTIN 300 MG/1
300 CAPSULE ORAL 2 TIMES DAILY
Qty: 60 CAPSULE | Refills: 5 | Status: SHIPPED | OUTPATIENT
Start: 2023-01-10 | End: 2023-02-09

## 2023-01-10 ASSESSMENT — ENCOUNTER SYMPTOMS
CONSTIPATION: 0
NAUSEA: 0
VOMITING: 0
DIARRHEA: 0
COUGH: 0
SHORTNESS OF BREATH: 0
WHEEZING: 0
RECTAL PAIN: 0
ABDOMINAL PAIN: 0

## 2023-01-10 ASSESSMENT — PATIENT HEALTH QUESTIONNAIRE - PHQ9
4. FEELING TIRED OR HAVING LITTLE ENERGY: 0
3. TROUBLE FALLING OR STAYING ASLEEP: 1
SUM OF ALL RESPONSES TO PHQ9 QUESTIONS 1 & 2: 2
SUM OF ALL RESPONSES TO PHQ QUESTIONS 1-9: 3
2. FEELING DOWN, DEPRESSED OR HOPELESS: 1
SUM OF ALL RESPONSES TO PHQ QUESTIONS 1-9: 3
1. LITTLE INTEREST OR PLEASURE IN DOING THINGS: 1
6. FEELING BAD ABOUT YOURSELF - OR THAT YOU ARE A FAILURE OR HAVE LET YOURSELF OR YOUR FAMILY DOWN: 0
8. MOVING OR SPEAKING SO SLOWLY THAT OTHER PEOPLE COULD HAVE NOTICED. OR THE OPPOSITE, BEING SO FIGETY OR RESTLESS THAT YOU HAVE BEEN MOVING AROUND A LOT MORE THAN USUAL: 0
5. POOR APPETITE OR OVEREATING: 0
SUM OF ALL RESPONSES TO PHQ QUESTIONS 1-9: 3
SUM OF ALL RESPONSES TO PHQ QUESTIONS 1-9: 3
7. TROUBLE CONCENTRATING ON THINGS, SUCH AS READING THE NEWSPAPER OR WATCHING TELEVISION: 0
10. IF YOU CHECKED OFF ANY PROBLEMS, HOW DIFFICULT HAVE THESE PROBLEMS MADE IT FOR YOU TO DO YOUR WORK, TAKE CARE OF THINGS AT HOME, OR GET ALONG WITH OTHER PEOPLE: 1
9. THOUGHTS THAT YOU WOULD BE BETTER OFF DEAD, OR OF HURTING YOURSELF: 0

## 2023-01-10 NOTE — PROGRESS NOTES
Guera Morejon (:  1950) is a 71 y.o. female,Established patient, here for evaluation of the following chief complaint(s):  Follow-up (For diabetes), Pain (Legs, feet and back all have been hurting more than usual.), Leg Pain (At night having sharp pains in her shins ), and Headache (Has been having more frontal headaches.)      ASSESSMENT/PLAN:    ICD-10-CM    1. 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 Acute low back pain, unspecified back pain laterality, unspecified whether sciatica present  M54.50 traMADol (ULTRAM) 50 MG tablet  Use as needed  Due to back pain  To be able to get around        3. RLS (restless legs syndrome)  G25.81 gabapentin (NEURONTIN) 600 MG tablet     gabapentin (NEURONTIN) 300 MG capsule      4. Low back pain, unspecified back pain laterality, unspecified chronicity, unspecified whether sciatica present  M54.50 gabapentin (NEURONTIN) 600 MG tablet      5. Diabetic polyneuropathy associated with type 2 diabetes mellitus ([facility])  E11.42 gabapentin (NEURONTIN) 600 MG tablet  Adjust at bedtime            Return in about 4 weeks (around 2023) for diabetes 30 minutes adjustment and labs.     SUBJECTIVE/OBJECTIVE:  HPI  Patient is here for diabetes follow up     Diabetes Mellitus Type 2        Diet compliance:  compliant all of the time  Nutrition Consultation Needed:  no  Medication:              Novolog 14 units twice a day  Trulicity1.5 weekly  Levemir 26 units twice a day   Medication compliance:  compliant all of the time  Weight trend: up 7 lbs  Current exercise: no  Checking: 3 times daily  Home blood sugar records: fasting 170s-190s afternoon and bedtime 400s-500s  Low BG:  no  Eye exam current (within one year): no  Checking Feet regularly:  yes - no issues  ACE/ARB:  no  Aspirin: Yes  Moderate intensity statin: stable     Known diabetic complications: cardiovascular disease  Barriers to success: none  Tobacco history: She  reports that she quit smoking about 6 years ago. Her smoking use included cigarettes. She has a 12.50 pack-year smoking history. She has never used smokeless tobacco.           Lab Results   Component Value Date     LABA1C 11.1 (H) 11/09/2022     GLUCOSE 304 12/09/2022            Lab Results   Component Value Date     LABMICR 27.60 (H) 09/02/2022     CREATININE 1.0 (H) 11/09/2022         Leg pain  Reports has been taking twice a day  Notes still having this and \"all through body \"  She reports she gets some relief   Denies sleepy   Problems sleeping  Due to sleep issues  Has been taking neurontin 300mg twice a day and 600mg at bedtime  She reports that it seems to be going alright  Other than having the sharp pain in front shin and stays       Reports that having some back pain  Due to weather change   She took to a pain pill of nephews   And it helped her when took it   She had some tramadol and took as needed last filled 12/9 *18  Like some more       DOMINIC was reviewed today per office protocol. Report shows No discrepancies. Fill pattern is consistent from single provider(s) at single pharmacy(s). Controlled Substance Monitoring:                /84 (Site: Right Upper Arm, Position: Sitting, Cuff Size: Large Adult)   Pulse 84   Temp 98.4 °F (36.9 °C) (Temporal)   Wt 245 lb 8 oz (111.4 kg)   SpO2 91%   BMI 43.49 kg/m²   Review of Systems   Constitutional:  Negative for activity change, appetite change, fatigue and fever. HENT:  Negative for congestion and postnasal drip. Respiratory:  Negative for cough, shortness of breath and wheezing (off and on).     Cardiovascular:  Negative for chest pain, palpitations and leg swelling (compression socks needed). Gastrointestinal:  Negative for abdominal pain, constipation, diarrhea, nausea, rectal pain and vomiting. Genitourinary:  Negative for difficulty urinating. Musculoskeletal:  Positive for arthralgias (knee issues). Skin:  Negative for wound (resolved). Neurological:  Negative for speech difficulty and headaches. Hematological:  Negative for adenopathy. Psychiatric/Behavioral:  Negative for behavioral problems and self-injury. The patient is not nervous/anxious and is not hyperactive. Physical Exam  Vitals reviewed. Constitutional:       General: She is not in acute distress. Appearance: Normal appearance. She is obese. She is not ill-appearing. HENT:      Right Ear: No middle ear effusion. There is no impacted cerumen. Left Ear: A middle ear effusion is present. Ears:      Comments: Hard of hearing    Cardiovascular:      Rate and Rhythm: Normal rate and regular rhythm. Heart sounds: No murmur heard. Pulmonary:      Effort: Pulmonary effort is normal.      Breath sounds: Normal breath sounds. Abdominal:      General: Bowel sounds are normal.   Musculoskeletal:         General: Tenderness (lower back  and thorsaic) present. Skin:     Findings: No rash. Neurological:      General: No focal deficit present. Mental Status: She is alert and oriented to person, place, and time. Psychiatric:         Mood and Affect: Mood normal.         Behavior: Behavior normal.                 An electronic signature was used to authenticate this note.     --JIA Soria

## 2023-02-22 ENCOUNTER — PROCEDURE VISIT (OUTPATIENT)
Dept: ENT CLINIC | Age: 73
End: 2023-02-22
Payer: MEDICARE

## 2023-02-22 ENCOUNTER — OFFICE VISIT (OUTPATIENT)
Dept: ENT CLINIC | Age: 73
End: 2023-02-22
Payer: MEDICARE

## 2023-02-22 VITALS
WEIGHT: 250 LBS | BODY MASS INDEX: 44.3 KG/M2 | SYSTOLIC BLOOD PRESSURE: 126 MMHG | HEIGHT: 63 IN | DIASTOLIC BLOOD PRESSURE: 82 MMHG

## 2023-02-22 DIAGNOSIS — H61.21 IMPACTED CERUMEN OF RIGHT EAR: ICD-10-CM

## 2023-02-22 DIAGNOSIS — H65.91 MEE (MIDDLE EAR EFFUSION), RIGHT: Primary | ICD-10-CM

## 2023-02-22 DIAGNOSIS — H90.6 MIXED CONDUCTIVE AND SENSORINEURAL HEARING LOSS OF BOTH EARS: Primary | ICD-10-CM

## 2023-02-22 PROCEDURE — 1036F TOBACCO NON-USER: CPT | Performed by: PHYSICIAN ASSISTANT

## 2023-02-22 PROCEDURE — G8427 DOCREV CUR MEDS BY ELIG CLIN: HCPCS | Performed by: PHYSICIAN ASSISTANT

## 2023-02-22 PROCEDURE — G8417 CALC BMI ABV UP PARAM F/U: HCPCS | Performed by: PHYSICIAN ASSISTANT

## 2023-02-22 PROCEDURE — 1090F PRES/ABSN URINE INCON ASSESS: CPT | Performed by: PHYSICIAN ASSISTANT

## 2023-02-22 PROCEDURE — 99203 OFFICE O/P NEW LOW 30 MIN: CPT | Performed by: PHYSICIAN ASSISTANT

## 2023-02-22 PROCEDURE — 3017F COLORECTAL CA SCREEN DOC REV: CPT | Performed by: PHYSICIAN ASSISTANT

## 2023-02-22 PROCEDURE — G8400 PT W/DXA NO RESULTS DOC: HCPCS | Performed by: PHYSICIAN ASSISTANT

## 2023-02-22 PROCEDURE — 1123F ACP DISCUSS/DSCN MKR DOCD: CPT | Performed by: PHYSICIAN ASSISTANT

## 2023-02-22 PROCEDURE — 92553 AUDIOMETRY AIR & BONE: CPT | Performed by: AUDIOLOGIST

## 2023-02-22 PROCEDURE — 92567 TYMPANOMETRY: CPT | Performed by: AUDIOLOGIST

## 2023-02-22 PROCEDURE — G8484 FLU IMMUNIZE NO ADMIN: HCPCS | Performed by: PHYSICIAN ASSISTANT

## 2023-02-22 RX ORDER — PSEUDOEPHEDRINE HCL 30 MG
30 TABLET ORAL 3 TIMES DAILY
Qty: 30 TABLET | Refills: 2 | Status: SHIPPED | OUTPATIENT
Start: 2023-02-22

## 2023-02-22 RX ORDER — FLUTICASONE PROPIONATE 50 MCG
2 SPRAY, SUSPENSION (ML) NASAL 2 TIMES DAILY
Qty: 16 G | Refills: 1 | Status: SHIPPED | OUTPATIENT
Start: 2023-02-22

## 2023-02-22 ASSESSMENT — ENCOUNTER SYMPTOMS
SINUS PRESSURE: 0
SORE THROAT: 0
VOICE CHANGE: 0
RHINORRHEA: 0
TROUBLE SWALLOWING: 0
EYE PAIN: 0
EYE DISCHARGE: 0
FACIAL SWELLING: 0
SINUS PAIN: 0

## 2023-02-22 NOTE — PROGRESS NOTES
[facility] OTOLARYNGOLOGY/ENT  Ms. Nasir Bacon is a pleasant 69-year-old  female that was referred by Heidi Saenz due to problems with decreased hearing. Patient reports this has been most notable to the right ear. She admits to having issues for about a year. She reports that her right ear will pop and act like that she has fluid. She was treated for an ear infection and reports that the fullness continued after the antibiotic therapy. Patient admits to wearing bilateral hearing aids. Audiology studies were completed today were reviewed with the patient. Patient was noted with severe flat hearing loss to the right ear with left ear demonstrating moderately severe to severe sloping hearing loss. Tympanogram was type B to the right with a partial cerumen impaction noted. The tympanogram to the left demonstrated type A.         Allergies: Imipramine hcl and Tofranil [imipramine]      Current Outpatient Medications   Medication Sig Dispense Refill    carbamide peroxide (DEBROX) 6.5 % otic solution Place 5 drops into the right ear three times daily for 14 days 15 mL 1    pseudoephedrine (DECONGESTANT) 30 MG tablet Take 1 tablet by mouth 3 times daily 30 tablet 2    fluticasone (FLONASE) 50 MCG/ACT nasal spray 2 sprays by Each Nostril route 2 times daily 16 g 1    LEVEMIR FLEXTOUCH 100 UNIT/ML injection pen Inject 30 Units into the skin 2 times daily 5 Adjustable Dose Pre-filled Pen Syringe 11    NOVOLIN N FLEXPEN 100 UNIT/ML injection pen Inject 14 Units into the skin 3 times daily 13 Adjustable Dose Pre-filled Pen Syringe 3    donepezil (ARICEPT) 10 MG tablet Take 1 tablet by mouth Daily 30 tablet 6    dulaglutide (TRULICITY) 1.5 LZ/4.5QU SC injection Inject 0.5 mLs into the skin once a week 4 Adjustable Dose Pre-filled Pen Syringe 11    mupirocin (BACTROBAN) 2 % ointment APPLY TOPICALLY THREE TIMES A DAY 30 g 10    Insulin Pen Needle (SURE COMFORT PEN NEEDLES) 32G X 4 MM MISC 1 each by Does not apply route 4 times daily 100 each 10    simvastatin (ZOCOR) 40 MG tablet TAKE 1 TABLET BY MOUTH NIGHTLY 30 tablet 10    levothyroxine (SYNTHROID) 112 MCG tablet Take 1 tablet by mouth daily 90 tablet 3    nystatin (MYCOSTATIN) 254156 UNIT/GM powder Apply 3 times daily. 60 g 0    albuterol sulfate HFA (VENTOLIN HFA) 108 (90 Base) MCG/ACT inhaler Inhale 2 puffs into the lungs 4 times daily as needed for Wheezing 3 each 3    loperamide (IMODIUM) 2 MG capsule Take 2 mg by mouth 4 times daily as needed for Diarrhea      pantoprazole (PROTONIX) 40 MG tablet Take 1 tablet by mouth daily 30 tablet 11    BD PEN NEEDLE MICRO U/F 32G X 6 MM MISC Inject 4 pens into the skin daily 100 each 5    TRINTELLIX 5 MG tablet Take 1 tablet by mouth daily 30 tablet 11    TRINTELLIX 10 MG TABS tablet Take 1 tablet by mouth daily Total of 15mg daily 30 tablet 11    umeclidinium-vilanterol (ANORO ELLIPTA) 62.5-25 MCG/INH AEPB inhaler Inhale 1 puff into the lungs daily 1 each 11    gabapentin (NEURONTIN) 600 MG tablet Take 1 tablet by mouth at bedtime for 30 days. 30 tablet 5    gabapentin (NEURONTIN) 300 MG capsule Take 1 capsule by mouth in the morning and at bedtime for 30 days. Am and afternoon 60 capsule 5     No current facility-administered medications for this visit.       Past Surgical History:   Procedure Laterality Date    CHOLECYSTECTOMY      OVARIAN CYST REMOVAL      TUBAL LIGATION         Past Medical History:   Diagnosis Date    COPD (chronic obstructive pulmonary disease) (HCC)     Diabetes mellitus (HCC)     GERD (gastroesophageal reflux disease)        No family history on file.    Social History     Tobacco Use    Smoking status: Former     Packs/day: 0.50     Years: 25.00     Pack years: 12.50     Types: Cigarettes     Quit date: 2016     Years since quittin.1    Smokeless tobacco: Never   Substance Use Topics    Alcohol use: Never           REVIEW OF SYSTEMS:  all other systems reviewed and are negative  Review of Systems  Constitutional:  Negative for chills and fever. HENT:  Positive for ear pain and hearing loss. Negative for congestion, dental problem, ear discharge, facial swelling, nosebleeds, postnasal drip, rhinorrhea, sinus pressure, sinus pain, sneezing, sore throat, tinnitus, trouble swallowing and voice change. Eyes:  Negative for pain and discharge. Neurological:  Negative for dizziness and headaches. Comments:     PHYSICAL EXAM:    /82   Ht 5' 3\" (1.6 m)   Wt 250 lb (113.4 kg)   BMI 44.29 kg/m²   Body mass index is 44.29 kg/m². General Appearance: well developed  and well nourished  Head/ Face: normocephalic and atraumatic  Vocal Quality: good/ normal  Ears: Right Ear: External: external ears normal Otoscopy Ear Canal: cerumen impaction and right lateral TM was the only part I can visualize. The TM appeared with a MARLENY. Otoscopy TM: TM's dull Left Ear: External: external ears normal Otoscopy Ear Canal: canal clear Otoscopy TM: scarred  Hearing: [place] R  Nose: nares normal and septum midline  Neck: supple and adenopathy none palpable  Thyroid: normal  Oral exam was unremarkable. Assessment & Plan:    Problem List Items Addressed This Visit       Impacted cerumen of right ear     Cerumen impaction of the right ear- with unsuccessful attempt for removal-9 o'clock position  Plan: I recommended treating this with Debrox and retrying the disimpaction in 10 days. MARLENY (middle ear effusion), right - Primary     Right middle ear effusion with type B tympanogram  Plan: I will place the patient on Sudafed and Flonase for the middle ear effusion. We will reassess in 10 to 14 days. No orders of the defined types were placed in this encounter.       Orders Placed This Encounter   Medications    carbamide peroxide (DEBROX) 6.5 % otic solution     Sig: Place 5 drops into the right ear three times daily for 14 days     Dispense:  15 mL     Refill:  1    pseudoephedrine (DECONGESTANT) 30 MG tablet     Sig: Take 1 tablet by mouth 3 times daily     Dispense:  30 tablet     Refill:  2    fluticasone (FLONASE) 50 MCG/ACT nasal spray     Si sprays by Each Nostril route 2 times daily     Dispense:  16 g     Refill:  1       Electronically signed by Tanisha Uriarte PA-C on 23 at 3:46 PM CST        Please note that this chart was generated using dragon dictation software. Although every effort was made to ensure the accuracy of this automated transcription, some errors in transcription may have occurred.

## 2023-02-22 NOTE — ASSESSMENT & PLAN NOTE
Cerumen impaction of the right ear- with unsuccessful attempt for removal-9 o'clock position  Plan: I recommended treating this with Debrox and retrying the disimpaction in 10 days.

## 2023-02-22 NOTE — ASSESSMENT & PLAN NOTE
Right middle ear effusion with type B tympanogram  Plan: I will place the patient on Sudafed and Flonase for the middle ear effusion. We will reassess in 10 to 14 days.

## 2023-02-22 NOTE — PROGRESS NOTES
History   Armando Sosa is a 71 y.o. female who presented to the clinic this date with complaints of decreased hearing in her right ear. She reported onset at least a year ago. She reported the left ear has been going in and out. She was recently seen by her primary care physician and was treated for middle ear effusion in the left ear. She has bilateral hearing devices. She noted the left one helps but the right does not give her much benefit. She has previous history of bilateral PE tube placement. Summary   Tympanometry consistent with possible middle ear effusion right and normal TM mobility left. Pure tone testing indicates severe hearing loss right and moderately severe to severe hearing loss left. Unmasked bone thresholds suggests a possible mixed hearing loss in at least one ear. Could not mask due to severity of air thresholds. Results   Otoscopy:   Right: Non-Occluding Cerumen, Tympanosclerosis noted on TM  Left: Clear EAC/Normal TM    Audiometry:   Right:  Severe  flat hearing loss  Left:  Moderately severe to severe  sloping hearing loss         Tympanometry:    Right: Type B  Left: Type A      Plan   Results of today's testing were discussed with Ms. Cristy Ontiveros and the following recommendations were made: Follow up with ENT as scheduled. Recheck hearing following medical management.          Audiogram and Acoustic Immittance

## 2023-03-06 DIAGNOSIS — M54.50 ACUTE LOW BACK PAIN, UNSPECIFIED BACK PAIN LATERALITY, UNSPECIFIED WHETHER SCIATICA PRESENT: ICD-10-CM

## 2023-03-06 RX ORDER — TRAMADOL HYDROCHLORIDE 50 MG/1
TABLET ORAL
Qty: 18 TABLET | Refills: 0 | OUTPATIENT
Start: 2023-03-06

## 2023-03-07 DIAGNOSIS — E11.49 CONTROLLED TYPE 2 DIABETES MELLITUS WITH OTHER NEUROLOGIC COMPLICATION, WITH LONG-TERM CURRENT USE OF INSULIN (HCC): ICD-10-CM

## 2023-03-07 DIAGNOSIS — Z79.4 CONTROLLED TYPE 2 DIABETES MELLITUS WITH OTHER NEUROLOGIC COMPLICATION, WITH LONG-TERM CURRENT USE OF INSULIN (HCC): ICD-10-CM

## 2023-03-07 RX ORDER — INSULIN DETEMIR 100 [IU]/ML
30 INJECTION, SOLUTION SUBCUTANEOUS 2 TIMES DAILY
Qty: 5 ADJUSTABLE DOSE PRE-FILLED PEN SYRINGE | Refills: 11 | Status: SHIPPED | OUTPATIENT
Start: 2023-03-07

## 2023-03-07 RX ORDER — HUMAN INSULIN 100 [IU]/ML
14 INJECTION, SUSPENSION SUBCUTANEOUS 3 TIMES DAILY
Qty: 13 ADJUSTABLE DOSE PRE-FILLED PEN SYRINGE | Refills: 3 | Status: SHIPPED | OUTPATIENT
Start: 2023-03-07 | End: 2023-03-09

## 2023-03-07 NOTE — TELEPHONE ENCOUNTER
Levimir and novolin are no longer available through her new insurance ([employer]) will need an alternative.  I have pt scheduled with Cristiano Sommer for Diabetes FU 3/9/23

## 2023-03-09 ENCOUNTER — OFFICE VISIT (OUTPATIENT)
Dept: FAMILY MEDICINE CLINIC | Age: 73
End: 2023-03-09
Payer: MEDICARE

## 2023-03-09 VITALS
HEIGHT: 63 IN | BODY MASS INDEX: 43.94 KG/M2 | WEIGHT: 248 LBS | HEART RATE: 78 BPM | OXYGEN SATURATION: 98 % | SYSTOLIC BLOOD PRESSURE: 126 MMHG | DIASTOLIC BLOOD PRESSURE: 82 MMHG | TEMPERATURE: 98.1 F

## 2023-03-09 DIAGNOSIS — E03.9 ACQUIRED HYPOTHYROIDISM: ICD-10-CM

## 2023-03-09 DIAGNOSIS — E11.69 TYPE 2 DIABETES MELLITUS WITH OTHER SPECIFIED COMPLICATION, WITH LONG-TERM CURRENT USE OF INSULIN (HCC): ICD-10-CM

## 2023-03-09 DIAGNOSIS — Z79.4 TYPE 2 DIABETES MELLITUS WITH OTHER SPECIFIED COMPLICATION, WITH LONG-TERM CURRENT USE OF INSULIN (HCC): ICD-10-CM

## 2023-03-09 DIAGNOSIS — G89.29 CHRONIC MIDLINE LOW BACK PAIN WITHOUT SCIATICA: ICD-10-CM

## 2023-03-09 DIAGNOSIS — E11.69 TYPE 2 DIABETES MELLITUS WITH OTHER SPECIFIED COMPLICATION, WITH LONG-TERM CURRENT USE OF INSULIN (HCC): Primary | ICD-10-CM

## 2023-03-09 DIAGNOSIS — Z79.4 TYPE 2 DIABETES MELLITUS WITH OTHER SPECIFIED COMPLICATION, WITH LONG-TERM CURRENT USE OF INSULIN (HCC): Primary | ICD-10-CM

## 2023-03-09 DIAGNOSIS — L21.9 SEBORRHEIC DERMATITIS: ICD-10-CM

## 2023-03-09 DIAGNOSIS — M54.50 CHRONIC MIDLINE LOW BACK PAIN WITHOUT SCIATICA: ICD-10-CM

## 2023-03-09 LAB
ALBUMIN SERPL-MCNC: 3.7 G/DL (ref 3.5–5.2)
ALP BLD-CCNC: 113 U/L (ref 35–104)
ALT SERPL-CCNC: 21 U/L (ref 5–33)
ANION GAP SERPL CALCULATED.3IONS-SCNC: 12 MMOL/L (ref 7–19)
AST SERPL-CCNC: 27 U/L (ref 5–32)
BASOPHILS ABSOLUTE: 0 K/UL (ref 0–0.2)
BASOPHILS RELATIVE PERCENT: 0.9 % (ref 0–1)
BILIRUB SERPL-MCNC: 0.4 MG/DL (ref 0.2–1.2)
BUN BLDV-MCNC: 17 MG/DL (ref 8–23)
CALCIUM SERPL-MCNC: 8.8 MG/DL (ref 8.8–10.2)
CHLORIDE BLD-SCNC: 100 MMOL/L (ref 98–111)
CO2: 26 MMOL/L (ref 22–29)
CREAT SERPL-MCNC: 1.1 MG/DL (ref 0.5–0.9)
EOSINOPHILS ABSOLUTE: 0.1 K/UL (ref 0–0.6)
EOSINOPHILS RELATIVE PERCENT: 3.2 % (ref 0–5)
GFR SERPL CREATININE-BSD FRML MDRD: 53 ML/MIN/{1.73_M2}
GLUCOSE BLD-MCNC: 300 MG/DL (ref 74–109)
HBA1C MFR BLD: 10.1 %
HBA1C MFR BLD: 10.1 % (ref 4–6)
HCT VFR BLD CALC: 43.2 % (ref 37–47)
HEMOGLOBIN: 13.9 G/DL (ref 12–16)
IMMATURE GRANULOCYTES #: 0 K/UL
LYMPHOCYTES ABSOLUTE: 0.8 K/UL (ref 1.1–4.5)
LYMPHOCYTES RELATIVE PERCENT: 18.8 % (ref 20–40)
MCH RBC QN AUTO: 30.5 PG (ref 27–31)
MCHC RBC AUTO-ENTMCNC: 32.2 G/DL (ref 33–37)
MCV RBC AUTO: 94.7 FL (ref 81–99)
MONOCYTES ABSOLUTE: 0.3 K/UL (ref 0–0.9)
MONOCYTES RELATIVE PERCENT: 6.3 % (ref 0–10)
NEUTROPHILS ABSOLUTE: 3.1 K/UL (ref 1.5–7.5)
NEUTROPHILS RELATIVE PERCENT: 70.6 % (ref 50–65)
PDW BLD-RTO: 12.7 % (ref 11.5–14.5)
PLATELET # BLD: 96 K/UL (ref 130–400)
PMV BLD AUTO: 11.7 FL (ref 9.4–12.3)
POTASSIUM SERPL-SCNC: 4.6 MMOL/L (ref 3.5–5)
RBC # BLD: 4.56 M/UL (ref 4.2–5.4)
SODIUM BLD-SCNC: 138 MMOL/L (ref 136–145)
TOTAL PROTEIN: 6.4 G/DL (ref 6.6–8.7)
TSH REFLEX FT4: 2.56 UIU/ML (ref 0.35–5.5)
WBC # BLD: 4.4 K/UL (ref 4.8–10.8)

## 2023-03-09 PROCEDURE — G8484 FLU IMMUNIZE NO ADMIN: HCPCS | Performed by: NURSE PRACTITIONER

## 2023-03-09 PROCEDURE — 1036F TOBACCO NON-USER: CPT | Performed by: NURSE PRACTITIONER

## 2023-03-09 PROCEDURE — 1123F ACP DISCUSS/DSCN MKR DOCD: CPT | Performed by: NURSE PRACTITIONER

## 2023-03-09 PROCEDURE — 3046F HEMOGLOBIN A1C LEVEL >9.0%: CPT | Performed by: NURSE PRACTITIONER

## 2023-03-09 PROCEDURE — G8400 PT W/DXA NO RESULTS DOC: HCPCS | Performed by: NURSE PRACTITIONER

## 2023-03-09 PROCEDURE — 83036 HEMOGLOBIN GLYCOSYLATED A1C: CPT | Performed by: NURSE PRACTITIONER

## 2023-03-09 PROCEDURE — 99214 OFFICE O/P EST MOD 30 MIN: CPT | Performed by: NURSE PRACTITIONER

## 2023-03-09 PROCEDURE — 3017F COLORECTAL CA SCREEN DOC REV: CPT | Performed by: NURSE PRACTITIONER

## 2023-03-09 PROCEDURE — G8417 CALC BMI ABV UP PARAM F/U: HCPCS | Performed by: NURSE PRACTITIONER

## 2023-03-09 PROCEDURE — 2022F DILAT RTA XM EVC RTNOPTHY: CPT | Performed by: NURSE PRACTITIONER

## 2023-03-09 PROCEDURE — G8427 DOCREV CUR MEDS BY ELIG CLIN: HCPCS | Performed by: NURSE PRACTITIONER

## 2023-03-09 PROCEDURE — 1090F PRES/ABSN URINE INCON ASSESS: CPT | Performed by: NURSE PRACTITIONER

## 2023-03-09 RX ORDER — INSULIN LISPRO 100 [IU]/ML
20 INJECTION, SOLUTION INTRAVENOUS; SUBCUTANEOUS
Qty: 5 ADJUSTABLE DOSE PRE-FILLED PEN SYRINGE | Refills: 5 | Status: SHIPPED | OUTPATIENT
Start: 2023-03-09

## 2023-03-09 RX ORDER — KETOCONAZOLE 20 MG/ML
SHAMPOO TOPICAL
Qty: 120 ML | Refills: 1 | Status: SHIPPED | OUTPATIENT
Start: 2023-03-09

## 2023-03-09 RX ORDER — INSULIN GLARGINE 300 U/ML
30 INJECTION, SOLUTION SUBCUTANEOUS 2 TIMES DAILY
Qty: 5 ADJUSTABLE DOSE PRE-FILLED PEN SYRINGE | Refills: 4 | Status: SHIPPED | OUTPATIENT
Start: 2023-03-09

## 2023-03-09 RX ORDER — DULAGLUTIDE 3 MG/.5ML
3 INJECTION, SOLUTION SUBCUTANEOUS WEEKLY
Qty: 4 ADJUSTABLE DOSE PRE-FILLED PEN SYRINGE | Refills: 0
Start: 2023-03-09

## 2023-03-09 ASSESSMENT — ENCOUNTER SYMPTOMS
EYES NEGATIVE: 1
BACK PAIN: 1
RESPIRATORY NEGATIVE: 1
GASTROINTESTINAL NEGATIVE: 1

## 2023-03-09 NOTE — PROGRESS NOTES
[facility], located within [facility] PHYSICIAN SERVICES  [facility]  ROXI SHAH  [address] [address]  Dept: [phone]  Dept Fax: [phone]  Loc: [phone]    Bar Maki is a 71 y.o. female who presents today for her medical conditions/complaints as noted below. Bar Maki is c/o of Follow-up Chronic Condition      Chief Complaint   Patient presents with    Follow-up Chronic Condition       HPI:     HPI  Patient presents today for routine follow up of DM. She completed labs prior to coming upstairs for her appt today. She has no new complaints today. Blood sugars have been elevated. Novolin N 3 times per day 14 units  Trulicity 1.5 mg weekly  Levemir 30 units BID  She was on Metformin previously years ago and it did cause issues with her. Patient is also complaining of a few itchy spots on her scalp.      Past Medical History:   Diagnosis Date    COPD (chronic obstructive pulmonary disease) (HCC)     Diabetes mellitus (HCC)     GERD (gastroesophageal reflux disease)         Past Surgical History:   Procedure Laterality Date    CHOLECYSTECTOMY      OVARIAN CYST REMOVAL      TUBAL LIGATION         Social History     Tobacco Use    Smoking status: Former     Packs/day: 0.50     Years: 25.00     Pack years: 12.50     Types: Cigarettes     Quit date:      Years since quittin.1    Smokeless tobacco: Never   Substance Use Topics    Alcohol use: Never        Current Outpatient Medications   Medication Sig Dispense Refill    insulin lispro, 1 Unit Dial, (HUMALOG KWIKPEN) 100 UNIT/ML SOPN Inject 20 Units into the skin 3 times daily (before meals) 5 Adjustable Dose Pre-filled Pen Syringe 5    Insulin Glargine, 2 Unit Dial, (TOUJEO MAX SOLOSTAR) 300 UNIT/ML SOPN Inject 30 Units into the skin in the morning and at bedtime 5 Adjustable Dose Pre-filled Pen Syringe 4    Dulaglutide (TRULICITY) 3 CU/5.7FA SOPN Inject 3 mg into the skin once a week 4 Adjustable Dose Pre-filled Pen Syringe 0    ketoconazole (NIZORAL) 2 % shampoo Apply topically daily as needed. 120 mL 1    LEVEMIR FLEXTOUCH 100 UNIT/ML injection pen Inject 30 Units into the skin 2 times daily 5 Adjustable Dose Pre-filled Pen Syringe 11    pseudoephedrine (DECONGESTANT) 30 MG tablet Take 1 tablet by mouth 3 times daily 30 tablet 2    fluticasone (FLONASE) 50 MCG/ACT nasal spray 2 sprays by Each Nostril route 2 times daily 16 g 1    donepezil (ARICEPT) 10 MG tablet Take 1 tablet by mouth Daily 30 tablet 6    mupirocin (BACTROBAN) 2 % ointment APPLY TOPICALLY THREE TIMES A DAY 30 g 10    Insulin Pen Needle (SURE COMFORT PEN NEEDLES) 32G X 4 MM MISC 1 each by Does not apply route 4 times daily 100 each 10    simvastatin (ZOCOR) 40 MG tablet TAKE 1 TABLET BY MOUTH NIGHTLY 30 tablet 10    levothyroxine (SYNTHROID) 112 MCG tablet Take 1 tablet by mouth daily 90 tablet 3    nystatin (MYCOSTATIN) 986018 UNIT/GM powder Apply 3 times daily. 60 g 0    albuterol sulfate HFA (VENTOLIN HFA) 108 (90 Base) MCG/ACT inhaler Inhale 2 puffs into the lungs 4 times daily as needed for Wheezing 3 each 3    loperamide (IMODIUM) 2 MG capsule Take 2 mg by mouth 4 times daily as needed for Diarrhea      pantoprazole (PROTONIX) 40 MG tablet Take 1 tablet by mouth daily 30 tablet 11    BD PEN NEEDLE MICRO U/F 32G X 6 MM MISC Inject 4 pens into the skin daily 100 each 5    TRINTELLIX 5 MG tablet Take 1 tablet by mouth daily 30 tablet 11    TRINTELLIX 10 MG TABS tablet Take 1 tablet by mouth daily Total of 15mg daily 30 tablet 11    umeclidinium-vilanterol (ANORO ELLIPTA) 62.5-25 MCG/INH AEPB inhaler Inhale 1 puff into the lungs daily 1 each 11    gabapentin (NEURONTIN) 600 MG tablet Take 1 tablet by mouth at bedtime for 30 days. 30 tablet 5    gabapentin (NEURONTIN) 300 MG capsule Take 1 capsule by mouth in the morning and at bedtime for 30 days. Am and afternoon 60 capsule 5     No current facility-administered medications for this visit.        Allergies   Allergen Reactions Imipramine Hcl Itching    Tofranil [Imipramine]        History reviewed. No pertinent family history. Subjective:      Review of Systems   Constitutional:  Positive for fatigue. HENT: Negative. Eyes: Negative. Respiratory: Negative. Cardiovascular: Negative. Gastrointestinal: Negative. Endocrine: Negative. Genitourinary: Negative. Musculoskeletal:  Positive for back pain (chronic). Skin:  Positive for rash (on scalp). Neurological: Negative. Hematological: Negative. Psychiatric/Behavioral: Negative. Objective:     Physical Exam  Vitals and nursing note reviewed. Constitutional:       Appearance: Normal appearance. She is well-developed. She is obese. HENT:      Head: Normocephalic and atraumatic. Right Ear: Hearing, tympanic membrane, ear canal and external ear normal.      Left Ear: Hearing, tympanic membrane, ear canal and external ear normal.      Nose: Nose normal.      Mouth/Throat:      Lips: No lesions. Pharynx: Oropharynx is clear. Uvula midline. Eyes:      General: Lids are normal.      Extraocular Movements: Extraocular movements intact. Conjunctiva/sclera: Conjunctivae normal.      Pupils: Pupils are equal, round, and reactive to light. Neck:      Thyroid: No thyroid mass or thyromegaly. Trachea: Trachea normal.   Cardiovascular:      Rate and Rhythm: Normal rate and regular rhythm. Heart sounds: Normal heart sounds. Pulmonary:      Effort: Pulmonary effort is normal.      Breath sounds: Normal breath sounds. Abdominal:      General: Bowel sounds are normal.      Palpations: Abdomen is soft. Musculoskeletal:      Cervical back: Normal, normal range of motion and neck supple. No tenderness. Thoracic back: No tenderness. Decreased range of motion. Lumbar back: Tenderness present. Decreased range of motion. Skin:     General: Skin is warm and dry.    Neurological:      Mental Status: She is alert and oriented to person, place, and time. Psychiatric:         Attention and Perception: Attention normal.         Mood and Affect: Mood and affect normal.         Speech: Speech normal.         Behavior: Behavior normal.         Thought Content: Thought content normal.         Cognition and Memory: Cognition normal.         Judgment: Judgment normal.       /82   Pulse 78   Temp 98.1 °F (36.7 °C)   Ht 5' 3\" (1.6 m)   Wt 248 lb (112.5 kg)   SpO2 98%   BMI 43.93 kg/m²     Assessment:      Diagnosis Orders   1. Type 2 diabetes mellitus with other specified complication, with long-term current use of insulin (HCC)  POCT glycosylated hemoglobin (Hb A1C)    insulin lispro, 1 Unit Dial, (HUMALOG KWIKPEN) 100 UNIT/ML SOPN    Insulin Glargine, 2 Unit Dial, (TOUJEO MAX SOLOSTAR) 300 UNIT/ML SOPN    Dulaglutide (TRULICITY) 3 WZ/7.0OS SOPN      2. Chronic midline low back pain without sciatica        3. Seborrheic dermatitis  ketoconazole (NIZORAL) 2 % shampoo          Results for orders placed or performed in visit on 03/09/23   POCT glycosylated hemoglobin (Hb A1C)   Result Value Ref Range    Hemoglobin A1C 10.1 %       Plan:     1. Type 2 diabetes mellitus with other specified complication, with long-term current use of insulin ([facility])  A1c has slightly improved. I am changing insulins including Levemir to Toujeo max. Sample provided in office. I am also switching to Humalog KwikPen. Patient is use up to 20 units 3 times daily before meals. I am also increasing Trulicity up to 3 mg weekly. I am having patient return in approximately 4 to 6 weeks on medication adjustments. - POCT glycosylated hemoglobin (Hb A1C)  - insulin lispro, 1 Unit Dial, (HUMALOG KWIKPEN) 100 UNIT/ML SOPN; Inject 20 Units into the skin 3 times daily (before meals)  Dispense: 5 Adjustable Dose Pre-filled Pen Syringe;  Refill: 5  - Insulin Glargine, 2 Unit Dial, (TOUJEO MAX SOLOSTAR) 300 UNIT/ML SOPN; Inject 30 Units into the skin in the morning and at bedtime  Dispense: 5 Adjustable Dose Pre-filled Pen Syringe; Refill: 4  - Dulaglutide (TRULICITY) 3 YF/8.6IW SOPN; Inject 3 mg into the skin once a week  Dispense: 4 Adjustable Dose Pre-filled Pen Syringe; Refill: 0    2. Chronic midline low back pain without sciatica  Stable    3. Seborrheic dermatitis  Shampoo as discussed. Patient states she does not wash her hair except for once every 3 to 4 weeks. I am having her do it more frequently with the shampoo at least once weekly. - ketoconazole (NIZORAL) 2 % shampoo; Apply topically daily as needed. Dispense: 120 mL; Refill: 1     Return in about 6 weeks (around 4/20/2023) for Diabetic Follow up, Medication Follow Up, with PCP. Orders Placed This Encounter   Procedures    POCT glycosylated hemoglobin (Hb A1C)       Orders Placed This Encounter   Medications    insulin lispro, 1 Unit Dial, (HUMALOG KWIKPEN) 100 UNIT/ML SOPN     Sig: Inject 20 Units into the skin 3 times daily (before meals)     Dispense:  5 Adjustable Dose Pre-filled Pen Syringe     Refill:  5    Insulin Glargine, 2 Unit Dial, (TOUJEO MAX SOLOSTAR) 300 UNIT/ML SOPN     Sig: Inject 30 Units into the skin in the morning and at bedtime     Dispense:  5 Adjustable Dose Pre-filled Pen Syringe     Refill:  4    Dulaglutide (TRULICITY) 3 UR/2.8IB SOPN     Sig: Inject 3 mg into the skin once a week     Dispense:  4 Adjustable Dose Pre-filled Pen Syringe     Refill:  0    ketoconazole (NIZORAL) 2 % shampoo     Sig: Apply topically daily as needed. Dispense:  120 mL     Refill:  1            Patient offered educational handouts and has had all questions answered. Patient voices understanding and agrees to plans along with risks and benefits of plan. Patient is instructed to continue prior meds, diet, and exercise plans as instructed. Patient agrees to follow up as instructed and sooner if needed. Patient agrees to go to ER if condition becomes emergent.       EMR Dragon/transcription disclaimer: Some of this encounter note is an electronic transcription/translation of spoken language to printed text. The electronic translation of spoken language may permit erroneous, or at times, nonsensical words or phrases to be inadvertently transcribed.  Although I have reviewed the note for such errors, some may still exist.    Electronically signed by JIA Cheatham on 3/9/2023 at 4:21 PM

## 2023-03-10 ENCOUNTER — TELEPHONE (OUTPATIENT)
Dept: FAMILY MEDICINE CLINIC | Age: 73
End: 2023-03-10

## 2023-03-10 DIAGNOSIS — N28.9 ABNORMAL KIDNEY FUNCTION: Primary | ICD-10-CM

## 2023-03-10 NOTE — TELEPHONE ENCOUNTER
----- Message from JIA Garcia sent at 3/10/2023  8:11 AM CST -----  Please let patient know that labs have returned. Discussed a1c in office yesterday. Kidney function was abnormal.  Please make sure she is not taking any otc NSAIDs and recommend referral to nephrology for CKD.  TSH was normal

## 2023-03-13 DIAGNOSIS — Z79.4 CONTROLLED TYPE 2 DIABETES MELLITUS WITH OTHER NEUROLOGIC COMPLICATION, WITH LONG-TERM CURRENT USE OF INSULIN (HCC): ICD-10-CM

## 2023-03-13 DIAGNOSIS — G25.81 RLS (RESTLESS LEGS SYNDROME): ICD-10-CM

## 2023-03-13 DIAGNOSIS — E11.49 CONTROLLED TYPE 2 DIABETES MELLITUS WITH OTHER NEUROLOGIC COMPLICATION, WITH LONG-TERM CURRENT USE OF INSULIN (HCC): ICD-10-CM

## 2023-03-13 NOTE — TELEPHONE ENCOUNTER
Called patient, spoke with: Patient regarding the results of the patients most recent Labs . I advised Patient of Rudolpho Star recommendations.    Patient did voice understanding

## 2023-03-14 RX ORDER — GABAPENTIN 300 MG/1
CAPSULE ORAL
Qty: 60 CAPSULE | Refills: 5 | Status: SHIPPED | OUTPATIENT
Start: 2023-03-14 | End: 2023-04-20

## 2023-03-20 ENCOUNTER — OFFICE VISIT (OUTPATIENT)
Dept: ENT CLINIC | Age: 73
End: 2023-03-20
Payer: MEDICARE

## 2023-03-20 VITALS
SYSTOLIC BLOOD PRESSURE: 124 MMHG | WEIGHT: 247 LBS | BODY MASS INDEX: 43.77 KG/M2 | DIASTOLIC BLOOD PRESSURE: 68 MMHG | HEIGHT: 63 IN

## 2023-03-20 DIAGNOSIS — H60.311 ACUTE DIFFUSE OTITIS EXTERNA OF RIGHT EAR: ICD-10-CM

## 2023-03-20 DIAGNOSIS — H61.21 IMPACTED CERUMEN OF RIGHT EAR: Primary | ICD-10-CM

## 2023-03-20 PROCEDURE — G8427 DOCREV CUR MEDS BY ELIG CLIN: HCPCS | Performed by: PHYSICIAN ASSISTANT

## 2023-03-20 PROCEDURE — 1123F ACP DISCUSS/DSCN MKR DOCD: CPT | Performed by: PHYSICIAN ASSISTANT

## 2023-03-20 PROCEDURE — G8400 PT W/DXA NO RESULTS DOC: HCPCS | Performed by: PHYSICIAN ASSISTANT

## 2023-03-20 PROCEDURE — G8417 CALC BMI ABV UP PARAM F/U: HCPCS | Performed by: PHYSICIAN ASSISTANT

## 2023-03-20 PROCEDURE — 3017F COLORECTAL CA SCREEN DOC REV: CPT | Performed by: PHYSICIAN ASSISTANT

## 2023-03-20 PROCEDURE — 99213 OFFICE O/P EST LOW 20 MIN: CPT | Performed by: PHYSICIAN ASSISTANT

## 2023-03-20 PROCEDURE — 1090F PRES/ABSN URINE INCON ASSESS: CPT | Performed by: PHYSICIAN ASSISTANT

## 2023-03-20 PROCEDURE — 1036F TOBACCO NON-USER: CPT | Performed by: PHYSICIAN ASSISTANT

## 2023-03-20 PROCEDURE — G8484 FLU IMMUNIZE NO ADMIN: HCPCS | Performed by: PHYSICIAN ASSISTANT

## 2023-03-20 PROCEDURE — 4130F TOPICAL PREP RX AOE: CPT | Performed by: PHYSICIAN ASSISTANT

## 2023-03-20 RX ORDER — OFLOXACIN 3 MG/ML
4 SOLUTION/ DROPS OPHTHALMIC 3 TIMES DAILY
Qty: 10 ML | Refills: 1 | Status: SHIPPED | OUTPATIENT
Start: 2023-03-20 | End: 2023-03-30

## 2023-03-20 NOTE — PROGRESS NOTES
Ms. Indra Arrington is a pleasant 46-year-old female that presents for a 2-week follow-up after treatment for some impaction to the right ear as well as a right middle ear effusion. Patient reports that she had lots of drainage after Debrox therapy. She reports that now she can hear back to normal and denies any additional problems. Physical examination with microscope revealed some residual cerumen that was suction down to the TM. She was noted to have purulent drainage consistent with otitis externa with the TM appearing to be with no erythema. Examination of the left ear demonstrated normal TM and canal.  Neck exam demonstrated no lymphadenopathy or thyromegaly. Impression: Resolved cerumen impaction to the right ear, right-sided otitis externa    Plan: I will place the patient on Floxin eardrops. Due to this being low-grade, she is to follow-up with me as needed. She was reminded to call if she has any questions or problems.       Electronically signed by Prem Mendoza PA-C on 3/20/23 at 12:04 PM CDT

## 2023-04-05 DIAGNOSIS — J41.0 SIMPLE CHRONIC BRONCHITIS (HCC): ICD-10-CM

## 2023-04-05 DIAGNOSIS — Z87.891 HISTORY OF TOBACCO ABUSE: ICD-10-CM

## 2023-04-05 RX ORDER — ALBUTEROL SULFATE 90 UG/1
2 AEROSOL, METERED RESPIRATORY (INHALATION) 4 TIMES DAILY PRN
Qty: 3 EACH | Refills: 3 | Status: SHIPPED | OUTPATIENT
Start: 2023-04-05

## 2023-04-05 NOTE — TELEPHONE ENCOUNTER
Sent rx to pharmacy for pt  Requested Prescriptions     Signed Prescriptions Disp Refills    albuterol sulfate HFA (VENTOLIN HFA) 108 (90 Base) MCG/ACT inhaler 3 each 3     Sig: Inhale 2 puffs into the lungs 4 times daily as needed for Wheezing     Authorizing Provider: Kelton Gunter     Ordering User: Mary Ann Price

## 2023-04-06 RX ORDER — UMECLIDINIUM BROMIDE AND VILANTEROL TRIFENATATE 62.5; 25 UG/1; UG/1
1 POWDER RESPIRATORY (INHALATION) DAILY
Qty: 1 EACH | Refills: 10 | Status: SHIPPED | OUTPATIENT
Start: 2023-04-06

## 2023-04-06 NOTE — TELEPHONE ENCOUNTER
Received fax from pharmacy requesting refill on pts medication(s). Pt was last seen in office on 3/9/2023  and has a follow up scheduled for 4/20/2023. Will send request to  Sunitha Berrios  for authorization.      Requested Prescriptions     Pending Prescriptions Disp Refills    ANORO ELLIPTA 62.5-25 MCG/ACT inhaler [Pharmacy Med Name: ANORO ELLIPTA 62.5-25MCG 62.5-25 Aerosol]  10     Sig: INHALE ONE (1) PUFF INTO THE LUNGS DAILY D
.

## 2023-04-20 ENCOUNTER — HOSPITAL ENCOUNTER (OUTPATIENT)
Dept: GENERAL RADIOLOGY | Age: 73
Discharge: HOME | End: 2023-04-20
Payer: MEDICARE

## 2023-04-20 ENCOUNTER — OFFICE VISIT (OUTPATIENT)
Dept: FAMILY MEDICINE CLINIC | Age: 73
End: 2023-04-20

## 2023-04-20 VITALS
HEART RATE: 112 BPM | OXYGEN SATURATION: 91 % | DIASTOLIC BLOOD PRESSURE: 78 MMHG | SYSTOLIC BLOOD PRESSURE: 128 MMHG | BODY MASS INDEX: 45.92 KG/M2 | WEIGHT: 259.25 LBS | TEMPERATURE: 98.5 F

## 2023-04-20 DIAGNOSIS — G89.4 CHRONIC PAIN SYNDROME: ICD-10-CM

## 2023-04-20 DIAGNOSIS — M25.561 CHRONIC PAIN OF BOTH KNEES: ICD-10-CM

## 2023-04-20 DIAGNOSIS — B96.89 ACUTE BACTERIAL SINUSITIS: Primary | ICD-10-CM

## 2023-04-20 DIAGNOSIS — R05.1 ACUTE COUGH: ICD-10-CM

## 2023-04-20 DIAGNOSIS — R25.2 LEG CRAMPS: ICD-10-CM

## 2023-04-20 DIAGNOSIS — G89.29 CHRONIC PAIN OF BOTH KNEES: ICD-10-CM

## 2023-04-20 DIAGNOSIS — M25.562 CHRONIC PAIN OF BOTH KNEES: ICD-10-CM

## 2023-04-20 DIAGNOSIS — J01.90 ACUTE BACTERIAL SINUSITIS: Primary | ICD-10-CM

## 2023-04-20 DIAGNOSIS — Z79.4 TYPE 2 DIABETES MELLITUS WITH OTHER SPECIFIED COMPLICATION, WITH LONG-TERM CURRENT USE OF INSULIN (HCC): ICD-10-CM

## 2023-04-20 DIAGNOSIS — E11.69 TYPE 2 DIABETES MELLITUS WITH OTHER SPECIFIED COMPLICATION, WITH LONG-TERM CURRENT USE OF INSULIN (HCC): ICD-10-CM

## 2023-04-20 LAB
ALBUMIN SERPL-MCNC: 3.7 G/DL (ref 3.5–5.2)
ALP SERPL-CCNC: 87 U/L (ref 35–104)
ALT SERPL-CCNC: 24 U/L (ref 5–33)
ANION GAP SERPL CALCULATED.3IONS-SCNC: 14 MMOL/L (ref 7–19)
AST SERPL-CCNC: 36 U/L (ref 5–32)
BASOPHILS # BLD: 0.1 K/UL (ref 0–0.2)
BASOPHILS NFR BLD: 1.3 % (ref 0–1)
BILIRUB SERPL-MCNC: 0.5 MG/DL (ref 0.2–1.2)
BUN SERPL-MCNC: 19 MG/DL (ref 8–23)
CALCIUM SERPL-MCNC: 9.3 MG/DL (ref 8.8–10.2)
CHLORIDE SERPL-SCNC: 104 MMOL/L (ref 98–111)
CO2 SERPL-SCNC: 27 MMOL/L (ref 22–29)
CREAT SERPL-MCNC: 1.3 MG/DL (ref 0.5–0.9)
EOSINOPHIL # BLD: 0.2 K/UL (ref 0–0.6)
EOSINOPHIL NFR BLD: 4.1 % (ref 0–5)
ERYTHROCYTE [DISTWIDTH] IN BLOOD BY AUTOMATED COUNT: 12.7 % (ref 11.5–14.5)
GLUCOSE SERPL-MCNC: 95 MG/DL (ref 74–109)
HCT VFR BLD AUTO: 44.4 % (ref 37–47)
HGB BLD-MCNC: 13.8 G/DL (ref 12–16)
IMM GRANULOCYTES # BLD: 0 K/UL
LYMPHOCYTES # BLD: 1.2 K/UL (ref 1.1–4.5)
LYMPHOCYTES NFR BLD: 25.1 % (ref 20–40)
MAGNESIUM SERPL-MCNC: 1.5 MG/DL (ref 1.6–2.4)
MCH RBC QN AUTO: 30.1 PG (ref 27–31)
MCHC RBC AUTO-ENTMCNC: 31.1 G/DL (ref 33–37)
MCV RBC AUTO: 96.9 FL (ref 81–99)
MONOCYTES # BLD: 0.4 K/UL (ref 0–0.9)
MONOCYTES NFR BLD: 7.9 % (ref 0–10)
NEUTROPHILS # BLD: 2.9 K/UL (ref 1.5–7.5)
NEUTS SEG NFR BLD: 61.4 % (ref 50–65)
PLATELET # BLD AUTO: 131 K/UL (ref 130–400)
PMV BLD AUTO: 11.4 FL (ref 9.4–12.3)
POTASSIUM SERPL-SCNC: 4.2 MMOL/L (ref 3.5–5)
PROT SERPL-MCNC: 7 G/DL (ref 6.6–8.7)
RBC # BLD AUTO: 4.58 M/UL (ref 4.2–5.4)
SODIUM SERPL-SCNC: 145 MMOL/L (ref 136–145)
WBC # BLD AUTO: 4.7 K/UL (ref 4.8–10.8)

## 2023-04-20 PROCEDURE — 71045 X-RAY EXAM CHEST 1 VIEW: CPT | Performed by: RADIOLOGY

## 2023-04-20 PROCEDURE — 71045 X-RAY EXAM CHEST 1 VIEW: CPT

## 2023-04-20 RX ORDER — AMOXICILLIN AND CLAVULANATE POTASSIUM 875; 125 MG/1; MG/1
1 TABLET, FILM COATED ORAL 2 TIMES DAILY
Qty: 20 TABLET | Refills: 0 | Status: SHIPPED | OUTPATIENT
Start: 2023-04-20 | End: 2023-04-30

## 2023-04-20 RX ORDER — DULAGLUTIDE 3 MG/.5ML
3 INJECTION, SOLUTION SUBCUTANEOUS WEEKLY
Qty: 12 ADJUSTABLE DOSE PRE-FILLED PEN SYRINGE | Refills: 3 | Status: SHIPPED | OUTPATIENT
Start: 2023-04-20

## 2023-04-20 RX ORDER — LOSARTAN POTASSIUM 25 MG/1
TABLET ORAL
COMMUNITY
Start: 2023-04-08

## 2023-04-20 RX ORDER — INSULIN GLARGINE 300 U/ML
30 INJECTION, SOLUTION SUBCUTANEOUS 2 TIMES DAILY
Qty: 5 ADJUSTABLE DOSE PRE-FILLED PEN SYRINGE | Refills: 4 | Status: SHIPPED | OUTPATIENT
Start: 2023-04-20

## 2023-04-20 RX ORDER — ALBUTEROL SULFATE 90 UG/1
2 AEROSOL, METERED RESPIRATORY (INHALATION) 4 TIMES DAILY PRN
Qty: 18 G | Refills: 5 | Status: SHIPPED | OUTPATIENT
Start: 2023-04-20

## 2023-04-20 RX ORDER — OFLOXACIN 3 MG/ML
SOLUTION/ DROPS OPHTHALMIC
COMMUNITY
Start: 2023-04-11

## 2023-04-20 RX ORDER — INSULIN LISPRO 100 [IU]/ML
20 INJECTION, SOLUTION INTRAVENOUS; SUBCUTANEOUS
Qty: 5 ADJUSTABLE DOSE PRE-FILLED PEN SYRINGE | Refills: 5 | Status: SHIPPED | OUTPATIENT
Start: 2023-04-20

## 2023-04-20 RX ORDER — OXYBUTYNIN CHLORIDE 5 MG/1
TABLET ORAL
COMMUNITY
Start: 2023-04-08

## 2023-04-20 ASSESSMENT — ENCOUNTER SYMPTOMS
ABDOMINAL PAIN: 0
SORE THROAT: 0
BACK PAIN: 1
WHEEZING: 1
COUGH: 1
SHORTNESS OF BREATH: 1

## 2023-04-20 NOTE — PROGRESS NOTES
Ela Brice (:  1950) is a 71 y.o. female,Established patient, here for evaluation of the following chief complaint(s):  Follow-up (For diabetes and medications), Shortness of Breath (When she gets up and walks she is having a hard time breathing, she is wanting a small portable o2 tank it is too hard for her to carry a regular portable tank), Knee Pain (Is having right knee pain it is making it hard for her to walk and it was swollen), Back Pain (Is having mid thoracic back pain it makes it hard to breath, it makes it hard for her to sit up straight), Swelling (Patient is feeling like she is bloated), Other (Patient is wanting a scooter that folds up and she can ride on.), and Diabetes (Patient states that her sugars have been running a lot better since changing her medications. )      ASSESSMENT/PLAN:    ICD-10-CM    1. Acute bacterial sinusitis  J01.90 amoxicillin-clavulanate (AUGMENTIN) 875-125 MG per tablet    B96.89 albuterol sulfate HFA (VENTOLIN HFA) 108 (90 Base) MCG/ACT inhaler  2 puffs every 4 hours for 3 days  Mucinex twice a day  Tons of fluids      2. Acute cough  R05.1 XR CHEST 1 VIEW     amoxicillin-clavulanate (AUGMENTIN) 875-125 MG per tablet     albuterol sulfate HFA (VENTOLIN HFA) 108 (90 Base) MCG/ACT inhaler      3. Leg cramps  R25.2 CBC with Auto Differential     Comprehensive Metabolic Panel     Magnesium  Will see if relief with pain managment      4. Chronic pain syndrome  [id number] [facility], JIA Marshall, Pain Medicine, [place]      5. Type 2 diabetes mellitus with other specified complication, with long-term current use of insulin ([facility])  E11.69 Dulaglutide (TRULICITY) 3 FD/8.9MB SOPN  Weekly  Reports monitor weights    Z79.4 Insulin Glargine, 2 Unit Dial, (TOUJEO MAX SOLOSTAR) 300 UNIT/ML SOPN     insulin lispro, 1 Unit Dial, (HUMALOG KWIKPEN) 100 UNIT/ML SOPN      6.  Chronic pain of both knees  [address], [facility] , JIA Marshall, Pain Medicine, [place]    E67.164

## 2023-04-21 DIAGNOSIS — R79.0 LOW MAGNESIUM LEVEL: Primary | ICD-10-CM

## 2023-04-21 RX ORDER — LANOLIN ALCOHOL/MO/W.PET/CERES
400 CREAM (GRAM) TOPICAL DAILY
Qty: 30 TABLET | Refills: 11 | Status: SHIPPED | OUTPATIENT
Start: 2023-04-21

## 2023-04-21 NOTE — TELEPHONE ENCOUNTER
----- Message from JIA Villanueva sent at 4/21/2023  8:12 AM CDT -----  Cmp electrolytes liver and kidneys stable  Magnesium low   Start magox 400mg 1po daily #30 11rf  Recheck mag level in 1 month  Cbc stable

## 2023-04-21 NOTE — TELEPHONE ENCOUNTER
Pt is aware. Pt would also like to know if she needed to go to pain management or if she could just have pain medication.

## 2023-04-24 DIAGNOSIS — E11.69 TYPE 2 DIABETES MELLITUS WITH OTHER SPECIFIED COMPLICATION, WITH LONG-TERM CURRENT USE OF INSULIN (HCC): ICD-10-CM

## 2023-04-24 DIAGNOSIS — Z79.4 TYPE 2 DIABETES MELLITUS WITH OTHER SPECIFIED COMPLICATION, WITH LONG-TERM CURRENT USE OF INSULIN (HCC): ICD-10-CM

## 2023-04-24 RX ORDER — OFLOXACIN 3 MG/ML
SOLUTION/ DROPS OPHTHALMIC
Qty: 15 ML | Refills: 1 | Status: SHIPPED | OUTPATIENT
Start: 2023-04-24

## 2023-04-24 RX ORDER — INSULIN GLARGINE 300 U/ML
INJECTION, SOLUTION SUBCUTANEOUS
Qty: 5 ADJUSTABLE DOSE PRE-FILLED PEN SYRINGE | Refills: 4 | Status: SHIPPED | OUTPATIENT
Start: 2023-04-24

## 2023-04-24 NOTE — TELEPHONE ENCOUNTER
[facility] called to clarify Toujeo rx they have 30 units bid and one has 30 in am and 20 at night.     Called pt to see which one it should be

## 2023-04-30 DIAGNOSIS — K21.9 GASTROESOPHAGEAL REFLUX DISEASE WITHOUT ESOPHAGITIS: ICD-10-CM

## 2023-05-01 RX ORDER — PANTOPRAZOLE SODIUM 40 MG/1
40 TABLET, DELAYED RELEASE ORAL DAILY
Qty: 90 TABLET | Refills: 3 | Status: SHIPPED | OUTPATIENT
Start: 2023-05-01

## 2023-05-16 DIAGNOSIS — Z12.31 ENCOUNTER FOR SCREENING MAMMOGRAM FOR MALIGNANT NEOPLASM OF BREAST: Primary | ICD-10-CM

## 2023-05-19 ENCOUNTER — OFFICE VISIT (OUTPATIENT)
Dept: FAMILY MEDICINE CLINIC | Age: 73
End: 2023-05-19
Payer: MEDICARE

## 2023-05-19 VITALS
TEMPERATURE: 98.5 F | BODY MASS INDEX: 43.4 KG/M2 | HEART RATE: 86 BPM | SYSTOLIC BLOOD PRESSURE: 136 MMHG | DIASTOLIC BLOOD PRESSURE: 78 MMHG | OXYGEN SATURATION: 91 % | WEIGHT: 245 LBS

## 2023-05-19 DIAGNOSIS — F17.211 CIGARETTE NICOTINE DEPENDENCE IN REMISSION: ICD-10-CM

## 2023-05-19 DIAGNOSIS — J40 BRONCHITIS: ICD-10-CM

## 2023-05-19 DIAGNOSIS — Z87.891 PERSONAL HISTORY OF TOBACCO USE: ICD-10-CM

## 2023-05-19 DIAGNOSIS — M54.50 ACUTE LOW BACK PAIN, UNSPECIFIED BACK PAIN LATERALITY, UNSPECIFIED WHETHER SCIATICA PRESENT: ICD-10-CM

## 2023-05-19 DIAGNOSIS — Z79.4 TYPE 2 DIABETES MELLITUS WITHOUT COMPLICATION, WITH LONG-TERM CURRENT USE OF INSULIN (HCC): Primary | ICD-10-CM

## 2023-05-19 DIAGNOSIS — E11.9 TYPE 2 DIABETES MELLITUS WITHOUT COMPLICATION, WITH LONG-TERM CURRENT USE OF INSULIN (HCC): Primary | ICD-10-CM

## 2023-05-19 PROCEDURE — 1090F PRES/ABSN URINE INCON ASSESS: CPT | Performed by: NURSE PRACTITIONER

## 2023-05-19 PROCEDURE — G8417 CALC BMI ABV UP PARAM F/U: HCPCS | Performed by: NURSE PRACTITIONER

## 2023-05-19 PROCEDURE — 1123F ACP DISCUSS/DSCN MKR DOCD: CPT | Performed by: NURSE PRACTITIONER

## 2023-05-19 PROCEDURE — 3046F HEMOGLOBIN A1C LEVEL >9.0%: CPT | Performed by: NURSE PRACTITIONER

## 2023-05-19 PROCEDURE — G0296 VISIT TO DETERM LDCT ELIG: HCPCS | Performed by: NURSE PRACTITIONER

## 2023-05-19 PROCEDURE — 1036F TOBACCO NON-USER: CPT | Performed by: NURSE PRACTITIONER

## 2023-05-19 PROCEDURE — G8400 PT W/DXA NO RESULTS DOC: HCPCS | Performed by: NURSE PRACTITIONER

## 2023-05-19 PROCEDURE — 2022F DILAT RTA XM EVC RTNOPTHY: CPT | Performed by: NURSE PRACTITIONER

## 2023-05-19 PROCEDURE — 99214 OFFICE O/P EST MOD 30 MIN: CPT | Performed by: NURSE PRACTITIONER

## 2023-05-19 PROCEDURE — G8427 DOCREV CUR MEDS BY ELIG CLIN: HCPCS | Performed by: NURSE PRACTITIONER

## 2023-05-19 PROCEDURE — 3017F COLORECTAL CA SCREEN DOC REV: CPT | Performed by: NURSE PRACTITIONER

## 2023-05-19 RX ORDER — GUAIFENESIN 600 MG/1
600 TABLET, EXTENDED RELEASE ORAL 2 TIMES DAILY
Qty: 30 TABLET | Refills: 0 | Status: SHIPPED | OUTPATIENT
Start: 2023-05-19 | End: 2023-06-03

## 2023-05-19 RX ORDER — TRAMADOL HYDROCHLORIDE 50 MG/1
50 TABLET ORAL EVERY 4 HOURS PRN
Qty: 18 TABLET | Refills: 0 | Status: SHIPPED | OUTPATIENT
Start: 2023-05-19 | End: 2023-05-22

## 2023-05-19 RX ORDER — LEVOFLOXACIN 500 MG/1
500 TABLET, FILM COATED ORAL DAILY
Qty: 7 TABLET | Refills: 0 | Status: SHIPPED | OUTPATIENT
Start: 2023-05-19 | End: 2023-05-26

## 2023-05-19 ASSESSMENT — ENCOUNTER SYMPTOMS
ABDOMINAL PAIN: 0
SHORTNESS OF BREATH: 0
SORE THROAT: 0
WHEEZING: 1
COUGH: 1
BACK PAIN: 1

## 2023-05-19 NOTE — PROGRESS NOTES
Geo Esquiveltingham (:  1950) is a 68 y.o. female,Established patient, here for evaluation of the following chief complaint(s):  Follow-up (For diabetes)      ASSESSMENT/PLAN:    ICD-10-CM    1. Type 2 diabetes mellitus without complication, with long-term current use of insulin (HCC)  E11.9 Will recent lows  Cont trulicity weekly   Toujeo 30units am 20 units pm  If glucose at checks 150or less  Hold or do half humulog three times a day        Z79.4       2. Bronchitis  J40 levoFLOXacin (LEVAQUIN) 500 MG tablet     guaiFENesin (MUCINEX) 600 MG extended release tablet  Increase fluids  If not better CT chest   As continues to wheeze  2 puffs every 6 hours   History of smoking quit 6 years ago  But smoked 30 years  Will do lung screen        3. Acute low back pain, unspecified back pain laterality, unspecified whether sciatica present  M54.50 traMADol (ULTRAM) 50 MG tablet          No follow-ups on file.     SUBJECTIVE/OBJECTIVE:  HPI  Patient is here for 1 month follow up for sinuses and cough  Xray negative  Augmentin twice a day  Still having cough spells worse at present   She reports productive at times  And just feeling bad  She reports just didn't get better          Diabetes Mellitus Type 2        Diet compliance:  compliant all of the time  Nutrition Consultation Needed:  no  Medication:             humalog 20  units twice a day  Trulicity 3mg  weekly  Toujeo 30 units am and 20 pm  ( wants twice a day )   Medication compliance:  compliant all of the time  Weight trend: up down 14 lbs but on scooter today  She notes the glucose other day went down   Was 86 but took her 20 units as usual  Then down to 55    Current exercise: no  Checking: 3 times daily  Home blood sugar records:  fasting 90s-130s afternoon and bedtime 160s-340 highest generally under 200s  Low BG:  no  Eye exam current (within one year): no  Checking Feet regularly:  yes - no issues  ACE/ARB:  no  Aspirin: Yes  Moderate intensity statin:

## 2023-05-24 DIAGNOSIS — F32.5 MAJOR DEPRESSIVE DISORDER IN FULL REMISSION, UNSPECIFIED WHETHER RECURRENT (HCC): ICD-10-CM

## 2023-05-25 RX ORDER — VORTIOXETINE 10 MG/1
TABLET, FILM COATED ORAL
Qty: 30 TABLET | Refills: 10 | Status: SHIPPED | OUTPATIENT
Start: 2023-05-25

## 2023-05-25 RX ORDER — VORTIOXETINE 5 MG/1
5 TABLET, FILM COATED ORAL DAILY
Qty: 30 TABLET | Refills: 10 | Status: SHIPPED | OUTPATIENT
Start: 2023-05-25

## 2023-05-25 NOTE — TELEPHONE ENCOUNTER
Received fax from pharmacy requesting refill on pts medication(s). Pt was last seen in office on 5/19/2023  and has a follow up scheduled for 6/2/2023. Will send request to  Radha Jimenez  for authorization.      Requested Prescriptions     Pending Prescriptions Disp Refills    TRINTELLIX 10 MG TABS tablet [Pharmacy Med Name: TRINTELLIX 10 MG TABLET 10 Tablet] 30 tablet 10     Sig: TAKE 1 TABLET BY MOUTH DAILY    TRINTELLIX 5 MG tablet [Pharmacy Med Name: Ann Sandee 5 MG TABLET 5 Tablet] 30 tablet 10     Sig: TAKE 1 TABLET BY MOUTH DAILY

## 2023-06-02 ENCOUNTER — OFFICE VISIT (OUTPATIENT)
Dept: FAMILY MEDICINE CLINIC | Age: 73
End: 2023-06-02

## 2023-06-02 VITALS
HEART RATE: 84 BPM | OXYGEN SATURATION: 92 % | DIASTOLIC BLOOD PRESSURE: 84 MMHG | TEMPERATURE: 97.1 F | WEIGHT: 250.25 LBS | BODY MASS INDEX: 44.33 KG/M2 | SYSTOLIC BLOOD PRESSURE: 132 MMHG

## 2023-06-02 DIAGNOSIS — Z87.891 HISTORY OF TOBACCO ABUSE: ICD-10-CM

## 2023-06-02 DIAGNOSIS — I70.209 DIABETES TYPE 2 WITH ATHEROSCLEROSIS OF ARTERIES OF EXTREMITIES (HCC): Primary | ICD-10-CM

## 2023-06-02 DIAGNOSIS — G47.01 INSOMNIA DUE TO MEDICAL CONDITION: ICD-10-CM

## 2023-06-02 DIAGNOSIS — J01.90 ACUTE BACTERIAL SINUSITIS: ICD-10-CM

## 2023-06-02 DIAGNOSIS — M54.50 ACUTE LOW BACK PAIN, UNSPECIFIED BACK PAIN LATERALITY, UNSPECIFIED WHETHER SCIATICA PRESENT: ICD-10-CM

## 2023-06-02 DIAGNOSIS — B96.89 ACUTE BACTERIAL SINUSITIS: ICD-10-CM

## 2023-06-02 DIAGNOSIS — J41.0 SIMPLE CHRONIC BRONCHITIS (HCC): ICD-10-CM

## 2023-06-02 DIAGNOSIS — E11.51 DIABETES TYPE 2 WITH ATHEROSCLEROSIS OF ARTERIES OF EXTREMITIES (HCC): Primary | ICD-10-CM

## 2023-06-02 DIAGNOSIS — J40 BRONCHITIS: ICD-10-CM

## 2023-06-02 DIAGNOSIS — R05.1 ACUTE COUGH: ICD-10-CM

## 2023-06-02 DIAGNOSIS — R79.0 LOW MAGNESIUM LEVEL: ICD-10-CM

## 2023-06-02 RX ORDER — TRAMADOL HYDROCHLORIDE 50 MG/1
50 TABLET ORAL 2 TIMES DAILY
Qty: 60 TABLET | Refills: 0 | Status: SHIPPED | OUTPATIENT
Start: 2023-06-02 | End: 2023-07-02

## 2023-06-02 RX ORDER — LANOLIN ALCOHOL/MO/W.PET/CERES
400 CREAM (GRAM) TOPICAL DAILY
Qty: 30 TABLET | Refills: 11 | Status: SHIPPED | OUTPATIENT
Start: 2023-06-02

## 2023-06-02 RX ORDER — GUAIFENESIN 600 MG/1
600 TABLET, EXTENDED RELEASE ORAL 2 TIMES DAILY
Qty: 60 TABLET | Refills: 5 | Status: SHIPPED | OUTPATIENT
Start: 2023-06-02 | End: 2023-07-02

## 2023-06-02 RX ORDER — PROCHLORPERAZINE 25 MG/1
1 SUPPOSITORY RECTAL
Qty: 4 EACH | Refills: 11 | Status: SHIPPED | OUTPATIENT
Start: 2023-06-02

## 2023-06-02 RX ORDER — ALBUTEROL SULFATE 90 UG/1
2 AEROSOL, METERED RESPIRATORY (INHALATION) 4 TIMES DAILY PRN
Qty: 18 G | Refills: 5 | Status: SHIPPED | OUTPATIENT
Start: 2023-06-02

## 2023-06-02 RX ORDER — UMECLIDINIUM BROMIDE AND VILANTEROL TRIFENATATE 62.5; 25 UG/1; UG/1
1 POWDER RESPIRATORY (INHALATION) DAILY
Qty: 1 EACH | Refills: 10 | Status: SHIPPED | OUTPATIENT
Start: 2023-06-02

## 2023-06-02 RX ORDER — AMITRIPTYLINE HYDROCHLORIDE 10 MG/1
10 TABLET, FILM COATED ORAL NIGHTLY
Qty: 30 TABLET | Refills: 5 | Status: SHIPPED | OUTPATIENT
Start: 2023-06-02

## 2023-06-02 RX ORDER — PROCHLORPERAZINE 25 MG/1
1 SUPPOSITORY RECTAL DAILY
Qty: 3 EACH | Refills: 11 | Status: SHIPPED | OUTPATIENT
Start: 2023-06-02

## 2023-06-02 ASSESSMENT — ENCOUNTER SYMPTOMS
ABDOMINAL PAIN: 0
BACK PAIN: 1
SHORTNESS OF BREATH: 0
WHEEZING: 0
COUGH: 0
SORE THROAT: 0

## 2023-06-02 NOTE — PROGRESS NOTES
Earnestine Davenport (:  1950) is a 68 y.o. female,Established patient, here for evaluation of the following chief complaint(s):  Follow-up (For lung CT and cough)      ASSESSMENT/PLAN:    ICD-10-CM    1. 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 Simple chronic bronchitis (HCC)  J41.0 umeclidinium-vilanterol (ANORO ELLIPTA) 62.5-25 MCG/ACT inhaler      3. History of tobacco abuse  Z87.891 umeclidinium-vilanterol (ANORO ELLIPTA) 62.5-25 MCG/ACT inhaler      4. Acute cough  R05.1 albuterol sulfate HFA (VENTOLIN HFA) 108 (90 Base) MCG/ACT inhaler      5. Acute bacterial sinusitis  J01.90 albuterol sulfate HFA (VENTOLIN HFA) 108 (90 Base) MCG/ACT inhaler  Resolved     B96.89       6. Bronchitis  J40 guaiFENesin (MUCINEX) 600 MG extended release tablet  Pending  CT chest discusssed importance        7. Low magnesium level  R79.0 magnesium oxide (MAGOX 400) 400 (240 Mg) MG tablet      8. Acute low back pain, unspecified back pain laterality, unspecified whether sciatica present  M54.50 traMADol (ULTRAM) 50 MG tablet  Cant get to pain management  Miserable not able to walk using power chair with relief  Will cont as benefits outweigh risks for pateint to do ADLS        9. Insomnia due to medical condition  G47.01 amitriptyline (ELAVIL) 10 MG tablet  Start at bedtime          Return in about 4 weeks (around 2023) for 1 month diabetes and pain screen and tramadol relief.     SUBJECTIVE/OBJECTIVE:  HPI  Patient is here for bronchitis follow up     Refuses her mammogram   Set for CT lung screen     Treated last week with levaquin and mucinex  Had failed augmentin  Reports has been doing better  Cough productive

## 2023-06-05 ENCOUNTER — HOSPITAL ENCOUNTER (OUTPATIENT)
Dept: NON INVASIVE DIAGNOSTICS | Age: 73
Discharge: HOME | End: 2023-06-05
Payer: MEDICARE

## 2023-06-05 DIAGNOSIS — R55 SYNCOPE, UNSPECIFIED SYNCOPE TYPE: ICD-10-CM

## 2023-06-05 DIAGNOSIS — R42 DIZZINESS: ICD-10-CM

## 2023-06-05 DIAGNOSIS — R06.02 SHORTNESS OF BREATH: ICD-10-CM

## 2023-06-05 DIAGNOSIS — R06.09 DOE (DYSPNEA ON EXERTION): ICD-10-CM

## 2023-06-05 LAB
LV EF: 60 %
LVEF MODALITY: NORMAL

## 2023-06-05 PROCEDURE — C8929 TTE W OR WO FOL WCON,DOPPLER: HCPCS

## 2023-06-05 PROCEDURE — 6360000004 HC RX CONTRAST MEDICATION: Performed by: NURSE PRACTITIONER

## 2023-06-05 RX ADMIN — PERFLUTREN 1.5 ML: 6.52 INJECTION, SUSPENSION INTRAVENOUS at 14:14

## 2023-06-06 ENCOUNTER — TELEPHONE (OUTPATIENT)
Dept: FAMILY MEDICINE CLINIC | Age: 73
End: 2023-06-06

## 2023-06-06 NOTE — TELEPHONE ENCOUNTER
Called patient, spoke with:  Patientregarding the results of the patients most recent Echo. I advised Patient of Michelle Garber recommendations.    Patient did voice understanding

## 2023-06-06 NOTE — TELEPHONE ENCOUNTER
----- Message from JIA Escalona sent at 6/6/2023  7:59 AM CDT -----  Echo normal valves  Very mild decrease in cardiac pumping ability  Cont tight blood pressure control

## 2023-06-22 DIAGNOSIS — Z79.4 CONTROLLED TYPE 2 DIABETES MELLITUS WITH OTHER NEUROLOGIC COMPLICATION, WITH LONG-TERM CURRENT USE OF INSULIN (HCC): ICD-10-CM

## 2023-06-22 DIAGNOSIS — E11.49 CONTROLLED TYPE 2 DIABETES MELLITUS WITH OTHER NEUROLOGIC COMPLICATION, WITH LONG-TERM CURRENT USE OF INSULIN (HCC): ICD-10-CM

## 2023-06-23 RX ORDER — DONEPEZIL HYDROCHLORIDE 10 MG/1
10 TABLET, FILM COATED ORAL DAILY
Qty: 30 TABLET | Refills: 10 | Status: SHIPPED | OUTPATIENT
Start: 2023-06-23

## 2023-06-23 NOTE — TELEPHONE ENCOUNTER
Received fax from pharmacy requesting refill on pts medication(s). Pt was last seen in office on 6/2/2023  and has a follow up scheduled for 6/30/2023. Will send request to  Whitney Hoff  for authorization.      Requested Prescriptions     Pending Prescriptions Disp Refills    donepezil (ARICEPT) 10 MG tablet [Pharmacy Med Name: DONEPEZIL 10 MG TABS 10 Tablet] 30 tablet 10     Sig: Take 1 tablet by mouth Daily

## 2023-06-27 DIAGNOSIS — L21.9 SEBORRHEIC DERMATITIS: ICD-10-CM

## 2023-06-27 RX ORDER — KETOCONAZOLE 20 MG/ML
SHAMPOO TOPICAL
Qty: 120 ML | Refills: 1 | Status: SHIPPED | OUTPATIENT
Start: 2023-06-27

## 2023-06-30 ENCOUNTER — OFFICE VISIT (OUTPATIENT)
Dept: FAMILY MEDICINE CLINIC | Age: 73
End: 2023-06-30
Payer: MEDICARE

## 2023-06-30 VITALS
SYSTOLIC BLOOD PRESSURE: 132 MMHG | OXYGEN SATURATION: 92 % | HEART RATE: 69 BPM | DIASTOLIC BLOOD PRESSURE: 86 MMHG | TEMPERATURE: 96.9 F | BODY MASS INDEX: 44.15 KG/M2 | WEIGHT: 249.25 LBS

## 2023-06-30 DIAGNOSIS — S81.812A SKIN TEAR OF LEFT LOWER LEG WITHOUT COMPLICATION, INITIAL ENCOUNTER: ICD-10-CM

## 2023-06-30 DIAGNOSIS — E11.65 CONTROLLED TYPE 2 DIABETES MELLITUS WITH HYPERGLYCEMIA, WITH LONG-TERM CURRENT USE OF INSULIN (HCC): Primary | ICD-10-CM

## 2023-06-30 DIAGNOSIS — Z79.4 CONTROLLED TYPE 2 DIABETES MELLITUS WITH HYPERGLYCEMIA, WITH LONG-TERM CURRENT USE OF INSULIN (HCC): Primary | ICD-10-CM

## 2023-06-30 DIAGNOSIS — M54.50 ACUTE LOW BACK PAIN, UNSPECIFIED BACK PAIN LATERALITY, UNSPECIFIED WHETHER SCIATICA PRESENT: ICD-10-CM

## 2023-06-30 DIAGNOSIS — E11.65 CONTROLLED TYPE 2 DIABETES MELLITUS WITH HYPERGLYCEMIA, WITH LONG-TERM CURRENT USE OF INSULIN (HCC): ICD-10-CM

## 2023-06-30 DIAGNOSIS — Z79.4 CONTROLLED TYPE 2 DIABETES MELLITUS WITH OTHER NEUROLOGIC COMPLICATION, WITH LONG-TERM CURRENT USE OF INSULIN (HCC): ICD-10-CM

## 2023-06-30 DIAGNOSIS — G25.81 RLS (RESTLESS LEGS SYNDROME): ICD-10-CM

## 2023-06-30 DIAGNOSIS — E11.42 DIABETIC POLYNEUROPATHY ASSOCIATED WITH TYPE 2 DIABETES MELLITUS (HCC): ICD-10-CM

## 2023-06-30 DIAGNOSIS — E11.49 CONTROLLED TYPE 2 DIABETES MELLITUS WITH OTHER NEUROLOGIC COMPLICATION, WITH LONG-TERM CURRENT USE OF INSULIN (HCC): ICD-10-CM

## 2023-06-30 DIAGNOSIS — Z79.4 CONTROLLED TYPE 2 DIABETES MELLITUS WITH HYPERGLYCEMIA, WITH LONG-TERM CURRENT USE OF INSULIN (HCC): ICD-10-CM

## 2023-06-30 DIAGNOSIS — M54.50 LOW BACK PAIN, UNSPECIFIED BACK PAIN LATERALITY, UNSPECIFIED CHRONICITY, UNSPECIFIED WHETHER SCIATICA PRESENT: ICD-10-CM

## 2023-06-30 LAB
ALBUMIN SERPL-MCNC: 3.6 G/DL (ref 3.5–5.2)
ALP SERPL-CCNC: 95 U/L (ref 35–104)
ALT SERPL-CCNC: 17 U/L (ref 5–33)
ANION GAP SERPL CALCULATED.3IONS-SCNC: 12 MMOL/L (ref 7–19)
AST SERPL-CCNC: 21 U/L (ref 5–32)
BASOPHILS # BLD: 0.1 K/UL (ref 0–0.2)
BASOPHILS NFR BLD: 0.9 % (ref 0–1)
BILIRUB SERPL-MCNC: 0.3 MG/DL (ref 0.2–1.2)
BUN SERPL-MCNC: 17 MG/DL (ref 8–23)
CALCIUM SERPL-MCNC: 9.7 MG/DL (ref 8.8–10.2)
CHLORIDE SERPL-SCNC: 101 MMOL/L (ref 98–111)
CO2 SERPL-SCNC: 27 MMOL/L (ref 22–29)
CREAT SERPL-MCNC: 1.1 MG/DL (ref 0.5–0.9)
CREAT UR-MCNC: 89 MG/DL (ref 28–217)
EOSINOPHIL # BLD: 0.2 K/UL (ref 0–0.6)
EOSINOPHIL NFR BLD: 3.4 % (ref 0–5)
ERYTHROCYTE [DISTWIDTH] IN BLOOD BY AUTOMATED COUNT: 12.1 % (ref 11.5–14.5)
GLUCOSE SERPL-MCNC: 186 MG/DL (ref 74–109)
HBA1C MFR BLD: 7.5 % (ref 4–6)
HCT VFR BLD AUTO: 41.7 % (ref 37–47)
HGB BLD-MCNC: 14.2 G/DL (ref 12–16)
IMM GRANULOCYTES # BLD: 0 K/UL
LYMPHOCYTES # BLD: 1.5 K/UL (ref 1.1–4.5)
LYMPHOCYTES NFR BLD: 25.7 % (ref 20–40)
MCH RBC QN AUTO: 30.9 PG (ref 27–31)
MCHC RBC AUTO-ENTMCNC: 34.1 G/DL (ref 33–37)
MCV RBC AUTO: 90.7 FL (ref 81–99)
MICROALBUMIN UR-MCNC: <1.2 MG/DL (ref 0–19)
MICROALBUMIN/CREAT UR-RTO: NORMAL MG/G
MONOCYTES # BLD: 0.4 K/UL (ref 0–0.9)
MONOCYTES NFR BLD: 6.9 % (ref 0–10)
NEUTROPHILS # BLD: 3.6 K/UL (ref 1.5–7.5)
NEUTS SEG NFR BLD: 62.8 % (ref 50–65)
PLATELET # BLD AUTO: 106 K/UL (ref 130–400)
PMV BLD AUTO: 11.2 FL (ref 9.4–12.3)
POTASSIUM SERPL-SCNC: 4 MMOL/L (ref 3.5–5)
PROT SERPL-MCNC: 7.2 G/DL (ref 6.6–8.7)
RBC # BLD AUTO: 4.6 M/UL (ref 4.2–5.4)
SODIUM SERPL-SCNC: 140 MMOL/L (ref 136–145)
T4 FREE SERPL-MCNC: 1.29 NG/DL (ref 0.93–1.7)
TSH SERPL DL<=0.005 MIU/L-ACNC: 2.84 UIU/ML (ref 0.27–4.2)
WBC # BLD AUTO: 5.8 K/UL (ref 4.8–10.8)

## 2023-06-30 PROCEDURE — G8417 CALC BMI ABV UP PARAM F/U: HCPCS | Performed by: NURSE PRACTITIONER

## 2023-06-30 PROCEDURE — 1090F PRES/ABSN URINE INCON ASSESS: CPT | Performed by: NURSE PRACTITIONER

## 2023-06-30 PROCEDURE — 3017F COLORECTAL CA SCREEN DOC REV: CPT | Performed by: NURSE PRACTITIONER

## 2023-06-30 PROCEDURE — 1036F TOBACCO NON-USER: CPT | Performed by: NURSE PRACTITIONER

## 2023-06-30 PROCEDURE — G8400 PT W/DXA NO RESULTS DOC: HCPCS | Performed by: NURSE PRACTITIONER

## 2023-06-30 PROCEDURE — G8427 DOCREV CUR MEDS BY ELIG CLIN: HCPCS | Performed by: NURSE PRACTITIONER

## 2023-06-30 PROCEDURE — 1123F ACP DISCUSS/DSCN MKR DOCD: CPT | Performed by: NURSE PRACTITIONER

## 2023-06-30 PROCEDURE — 3046F HEMOGLOBIN A1C LEVEL >9.0%: CPT | Performed by: NURSE PRACTITIONER

## 2023-06-30 PROCEDURE — 99214 OFFICE O/P EST MOD 30 MIN: CPT | Performed by: NURSE PRACTITIONER

## 2023-06-30 PROCEDURE — 2022F DILAT RTA XM EVC RTNOPTHY: CPT | Performed by: NURSE PRACTITIONER

## 2023-06-30 RX ORDER — GABAPENTIN 300 MG/1
300 CAPSULE ORAL 2 TIMES DAILY
Qty: 60 CAPSULE | Refills: 5 | Status: SHIPPED | OUTPATIENT
Start: 2023-06-30 | End: 2023-12-27

## 2023-06-30 RX ORDER — SULFAMETHOXAZOLE AND TRIMETHOPRIM 800; 160 MG/1; MG/1
1 TABLET ORAL 2 TIMES DAILY
Qty: 20 TABLET | Refills: 0 | Status: SHIPPED | OUTPATIENT
Start: 2023-06-30 | End: 2023-07-10

## 2023-06-30 RX ORDER — GABAPENTIN 600 MG/1
600 TABLET ORAL NIGHTLY
Qty: 30 TABLET | Refills: 5 | Status: SHIPPED | OUTPATIENT
Start: 2023-06-30 | End: 2023-12-27

## 2023-06-30 RX ORDER — TRAMADOL HYDROCHLORIDE 50 MG/1
50 TABLET ORAL EVERY 6 HOURS PRN
Qty: 120 TABLET | Refills: 0 | Status: SHIPPED | OUTPATIENT
Start: 2023-06-30 | End: 2023-07-30

## 2023-06-30 ASSESSMENT — ENCOUNTER SYMPTOMS
SHORTNESS OF BREATH: 0
BACK PAIN: 1
ABDOMINAL PAIN: 0
COUGH: 0
SORE THROAT: 0
WHEEZING: 0

## 2023-07-03 ENCOUNTER — TELEPHONE (OUTPATIENT)
Dept: FAMILY MEDICINE CLINIC | Age: 73
End: 2023-07-03

## 2023-07-03 NOTE — TELEPHONE ENCOUNTER
Called patient, spoke with: Patient regarding the results of the patients most recent labs. I advised Patient of Cinda Mcclelland recommendations.    Patient did voice understanding

## 2023-07-06 DIAGNOSIS — M54.50 ACUTE LOW BACK PAIN, UNSPECIFIED BACK PAIN LATERALITY, UNSPECIFIED WHETHER SCIATICA PRESENT: ICD-10-CM

## 2023-07-06 RX ORDER — TRAMADOL HYDROCHLORIDE 50 MG/1
TABLET ORAL
Qty: 60 TABLET | Refills: 0 | OUTPATIENT
Start: 2023-07-06

## 2023-07-19 LAB
BACTERIA UR CULT: ABNORMAL
BACTERIA UR CULT: ABNORMAL
ORGANISM: ABNORMAL

## 2023-07-24 DIAGNOSIS — E11.69 TYPE 2 DIABETES MELLITUS WITH OTHER SPECIFIED COMPLICATION, WITH LONG-TERM CURRENT USE OF INSULIN (HCC): ICD-10-CM

## 2023-07-24 DIAGNOSIS — Z79.4 TYPE 2 DIABETES MELLITUS WITH OTHER SPECIFIED COMPLICATION, WITH LONG-TERM CURRENT USE OF INSULIN (HCC): ICD-10-CM

## 2023-07-25 ENCOUNTER — TELEPHONE (OUTPATIENT)
Dept: FAMILY MEDICINE CLINIC | Age: 73
End: 2023-07-25

## 2023-07-25 RX ORDER — INSULIN LISPRO 100 [IU]/ML
20 INJECTION, SOLUTION INTRAVENOUS; SUBCUTANEOUS
Qty: 15 ML | Refills: 10 | Status: SHIPPED | OUTPATIENT
Start: 2023-07-25

## 2023-07-25 NOTE — TELEPHONE ENCOUNTER
JUST AN FYI    Pt called, she has a large blister on her leg and wanted to know what she could do for it. I told her that she would need to be seen to have it looked at and get recommendations. She said she wanted to see [facility]. I told her that Jeff Mercado was out and I could get her in with someone else. She said no, I have an appt with Jeff Mercado and ill wait. I told her it was over a month away. I told her that she needed to be seen before then and she said no, ill deal with it. Just let ivon know.

## 2023-07-25 NOTE — TELEPHONE ENCOUNTER
Please call her and tell her that it needs to be looked at. A blister can become something way bigger than just a blister in a very short period of time.

## 2023-07-25 NOTE — TELEPHONE ENCOUNTER
Received fax from pharmacy requesting refill on pts medication(s). Pt was last seen in office on 6/30/2023  and has a follow up scheduled for 8/29/2023. Will send request to  Mikael Davila  for authorization.      Requested Prescriptions     Pending Prescriptions Disp Refills    insulin lispro, 1 Unit Dial, (HUMALOG/ADMELOG) 100 UNIT/ML SOPN [Pharmacy Med Name: INS LISPRO 100U/ML KWIK 15 100 Solution Pen-injector] 15 mL 10     Sig: INJECT 20 UNITS SUBCUTANEOUSLY THREE TIMES A DAY BEFORE MEALS

## 2023-07-26 NOTE — TELEPHONE ENCOUNTER
Left voicemail for patient instructing her JIA Hidalgo stated the patient needs to be seen. Left message for patient to call office back to schedule.

## 2023-07-26 NOTE — TELEPHONE ENCOUNTER
Spoke with patient and she wanted to wait until 8/29 to see [facility] after I advised her of Mellisa Young suggestion. Her son spoke with me also and asked if this is something that needs to be seen about sooner, I informed him of Mellisa Young recommendations. He agreed she needs to be seen sooner and I was able to schedule patient 7/28 with [facility] KYLE.

## 2023-07-28 ENCOUNTER — OFFICE VISIT (OUTPATIENT)
Dept: FAMILY MEDICINE CLINIC | Age: 73
End: 2023-07-28
Payer: MEDICARE

## 2023-07-28 VITALS
HEIGHT: 63 IN | OXYGEN SATURATION: 85 % | TEMPERATURE: 96.9 F | BODY MASS INDEX: 46.29 KG/M2 | DIASTOLIC BLOOD PRESSURE: 84 MMHG | HEART RATE: 94 BPM | WEIGHT: 261.25 LBS | SYSTOLIC BLOOD PRESSURE: 130 MMHG

## 2023-07-28 DIAGNOSIS — E11.69 TYPE 2 DIABETES MELLITUS WITH OTHER SPECIFIED COMPLICATION, WITH LONG-TERM CURRENT USE OF INSULIN (HCC): ICD-10-CM

## 2023-07-28 DIAGNOSIS — Z79.4 TYPE 2 DIABETES MELLITUS WITH OTHER SPECIFIED COMPLICATION, WITH LONG-TERM CURRENT USE OF INSULIN (HCC): ICD-10-CM

## 2023-07-28 DIAGNOSIS — L03.116 CELLULITIS OF LEFT LOWER EXTREMITY: Primary | ICD-10-CM

## 2023-07-28 DIAGNOSIS — J44.9 CHRONIC OBSTRUCTIVE PULMONARY DISEASE, UNSPECIFIED COPD TYPE (HCC): ICD-10-CM

## 2023-07-28 PROCEDURE — 99213 OFFICE O/P EST LOW 20 MIN: CPT | Performed by: NURSE PRACTITIONER

## 2023-07-28 PROCEDURE — 3051F HG A1C>EQUAL 7.0%<8.0%: CPT | Performed by: NURSE PRACTITIONER

## 2023-07-28 PROCEDURE — G8427 DOCREV CUR MEDS BY ELIG CLIN: HCPCS | Performed by: NURSE PRACTITIONER

## 2023-07-28 PROCEDURE — G8400 PT W/DXA NO RESULTS DOC: HCPCS | Performed by: NURSE PRACTITIONER

## 2023-07-28 PROCEDURE — G8417 CALC BMI ABV UP PARAM F/U: HCPCS | Performed by: NURSE PRACTITIONER

## 2023-07-28 PROCEDURE — 3017F COLORECTAL CA SCREEN DOC REV: CPT | Performed by: NURSE PRACTITIONER

## 2023-07-28 PROCEDURE — 3023F SPIROM DOC REV: CPT | Performed by: NURSE PRACTITIONER

## 2023-07-28 PROCEDURE — 1036F TOBACCO NON-USER: CPT | Performed by: NURSE PRACTITIONER

## 2023-07-28 PROCEDURE — 1123F ACP DISCUSS/DSCN MKR DOCD: CPT | Performed by: NURSE PRACTITIONER

## 2023-07-28 PROCEDURE — 1090F PRES/ABSN URINE INCON ASSESS: CPT | Performed by: NURSE PRACTITIONER

## 2023-07-28 PROCEDURE — 2022F DILAT RTA XM EVC RTNOPTHY: CPT | Performed by: NURSE PRACTITIONER

## 2023-07-28 RX ORDER — CLINDAMYCIN HYDROCHLORIDE 300 MG/1
300 CAPSULE ORAL 3 TIMES DAILY
Qty: 30 CAPSULE | Refills: 0 | Status: SHIPPED | OUTPATIENT
Start: 2023-07-28 | End: 2023-08-07

## 2023-07-30 DIAGNOSIS — Z79.4 TYPE 2 DIABETES MELLITUS WITH OTHER SPECIFIED COMPLICATION, WITH LONG-TERM CURRENT USE OF INSULIN (HCC): ICD-10-CM

## 2023-07-30 DIAGNOSIS — E11.69 TYPE 2 DIABETES MELLITUS WITH OTHER SPECIFIED COMPLICATION, WITH LONG-TERM CURRENT USE OF INSULIN (HCC): ICD-10-CM

## 2023-07-31 RX ORDER — INSULIN LISPRO 100 [IU]/ML
INJECTION, SOLUTION INTRAVENOUS; SUBCUTANEOUS
Qty: 15 ML | Refills: 10 | OUTPATIENT
Start: 2023-07-31

## 2023-08-06 DIAGNOSIS — Z79.4 TYPE 2 DIABETES MELLITUS WITH OTHER SPECIFIED COMPLICATION, WITH LONG-TERM CURRENT USE OF INSULIN (HCC): ICD-10-CM

## 2023-08-06 DIAGNOSIS — E11.69 TYPE 2 DIABETES MELLITUS WITH OTHER SPECIFIED COMPLICATION, WITH LONG-TERM CURRENT USE OF INSULIN (HCC): ICD-10-CM

## 2023-08-07 RX ORDER — INSULIN LISPRO 100 [IU]/ML
INJECTION, SOLUTION INTRAVENOUS; SUBCUTANEOUS
Qty: 15 ML | Refills: 10 | OUTPATIENT
Start: 2023-08-07

## 2023-08-08 DIAGNOSIS — E11.69 TYPE 2 DIABETES MELLITUS WITH OTHER SPECIFIED COMPLICATION, WITH LONG-TERM CURRENT USE OF INSULIN (HCC): ICD-10-CM

## 2023-08-08 DIAGNOSIS — Z79.4 TYPE 2 DIABETES MELLITUS WITH OTHER SPECIFIED COMPLICATION, WITH LONG-TERM CURRENT USE OF INSULIN (HCC): ICD-10-CM

## 2023-08-09 RX ORDER — INSULIN LISPRO 100 [IU]/ML
INJECTION, SOLUTION INTRAVENOUS; SUBCUTANEOUS
Qty: 15 ML | Refills: 10 | OUTPATIENT
Start: 2023-08-09

## 2023-08-09 NOTE — TELEPHONE ENCOUNTER
----- Message from JIA Herrera sent at 7/1/2023  9:02 AM CDT -----  Thyroid normal   Cmp electrolytes liver and kidneys normal   Glucose improving great job   Urine good no abnormal protein noted   CBC stable no anemia or concerns   A1c much improved great job recheck in 3 months Detail Level: Detailed

## 2023-08-14 DIAGNOSIS — Z79.4 TYPE 2 DIABETES MELLITUS WITH OTHER SPECIFIED COMPLICATION, WITH LONG-TERM CURRENT USE OF INSULIN (HCC): ICD-10-CM

## 2023-08-14 DIAGNOSIS — E11.69 TYPE 2 DIABETES MELLITUS WITH OTHER SPECIFIED COMPLICATION, WITH LONG-TERM CURRENT USE OF INSULIN (HCC): ICD-10-CM

## 2023-08-14 RX ORDER — INSULIN LISPRO 100 [IU]/ML
20 INJECTION, SOLUTION INTRAVENOUS; SUBCUTANEOUS
Qty: 15 ML | Refills: 10 | Status: SHIPPED | OUTPATIENT
Start: 2023-08-14

## 2023-08-14 NOTE — TELEPHONE ENCOUNTER
Received fax from pharmacy requesting refill on pts medication(s). Pt was last seen in office on 7/28/2023  and has a follow up scheduled for 8/29/2023. Will send request to  Vamshi Muniz  for patient.      Requested Prescriptions     Pending Prescriptions Disp Refills    insulin lispro, 1 Unit Dial, (HUMALOG/ADMELOG) 100 UNIT/ML SOPN [Pharmacy Med Name: INS LISPRO 100U/ML KWIK 15 100 Solution Pen-injector] 15 mL 10     Sig: Inject 20 Units into the skin 3 times daily (before meals)

## 2023-08-24 DIAGNOSIS — G25.81 RLS (RESTLESS LEGS SYNDROME): ICD-10-CM

## 2023-08-24 DIAGNOSIS — E11.49 CONTROLLED TYPE 2 DIABETES MELLITUS WITH OTHER NEUROLOGIC COMPLICATION, WITH LONG-TERM CURRENT USE OF INSULIN (HCC): ICD-10-CM

## 2023-08-24 DIAGNOSIS — E11.65 UNCONTROLLED TYPE 2 DIABETES MELLITUS WITH HYPERGLYCEMIA (HCC): ICD-10-CM

## 2023-08-24 DIAGNOSIS — E03.4 HYPOTHYROIDISM DUE TO ACQUIRED ATROPHY OF THYROID: ICD-10-CM

## 2023-08-24 DIAGNOSIS — Z79.4 CONTROLLED TYPE 2 DIABETES MELLITUS WITH OTHER NEUROLOGIC COMPLICATION, WITH LONG-TERM CURRENT USE OF INSULIN (HCC): ICD-10-CM

## 2023-08-24 DIAGNOSIS — E78.2 MIXED HYPERLIPIDEMIA: ICD-10-CM

## 2023-08-24 DIAGNOSIS — L01.00 IMPETIGO: ICD-10-CM

## 2023-08-25 RX ORDER — SIMVASTATIN 40 MG
40 TABLET ORAL NIGHTLY
Qty: 30 TABLET | Refills: 10 | Status: SHIPPED | OUTPATIENT
Start: 2023-08-25

## 2023-08-25 RX ORDER — GABAPENTIN 300 MG/1
300 CAPSULE ORAL 2 TIMES DAILY
Qty: 60 CAPSULE | Refills: 2 | Status: SHIPPED | OUTPATIENT
Start: 2023-08-25 | End: 2023-11-23

## 2023-08-25 RX ORDER — LEVOTHYROXINE SODIUM 112 UG/1
112 TABLET ORAL DAILY
Qty: 30 TABLET | Refills: 10 | Status: SHIPPED | OUTPATIENT
Start: 2023-08-25

## 2023-08-25 RX ORDER — PEN NEEDLE, DIABETIC 32GX 5/32"
1 NEEDLE, DISPOSABLE MISCELLANEOUS 4 TIMES DAILY
Qty: 100 EACH | Refills: 11 | Status: SHIPPED | OUTPATIENT
Start: 2023-08-25

## 2023-08-25 NOTE — TELEPHONE ENCOUNTER
Received fax from pharmacy requesting refill on pts medication(s). Pt was last seen in office on 7/28/2023  and has a follow up scheduled for 8/24/2023. Will send request to  Cinda Mcclelland  for authorization.      Requested Prescriptions     Pending Prescriptions Disp Refills    simvastatin (ZOCOR) 40 MG tablet [Pharmacy Med Name: SIMVASTATIN 40 MG TABS 40 Tablet] 30 tablet 10     Sig: TAKE 1 TABLET BY MOUTH NIGHTLY    SURE COMFORT PEN NEEDLES 32G X 4 MM MISC [Pharmacy Med Name: SURE COMFORT [id number] PEN 32G X 4 MM Miscellaneous]  10     Sig: USE 1 PEN NEEDLE FOUR TIMES A DAY

## 2023-08-25 NOTE — TELEPHONE ENCOUNTER
Received fax from pharmacy requesting refill on pts medication(s). Pt was last seen in office on 7/28/2023  and has a follow up scheduled for 8/29/2023. Will send request to  [address]  for authorization. Requested Prescriptions     Pending Prescriptions Disp Refills    levothyroxine (SYNTHROID) 112 MCG tablet [Pharmacy Med Name: LEVOTHYROXINE 112 MCG  Tablet] 30 tablet 10     Sig: TAKE 1 TABLET BY MOUTH DAILY    mupirocin (BACTROBAN) 2 % ointment [Pharmacy Med Name: MUPIROCIN 2% OINT 15GM 2 Ointment] 30 g 10     Sig: APPLY TOPICALLY TO AFFECTED AREAS THREE TIMES A DAY    gabapentin (NEURONTIN) 300 MG capsule [Pharmacy Med Name: GABAPENTIN 300 MG CAPSULE 300 Capsule] 60 capsule 2     Sig: Take 1 capsule by mouth in the morning and at bedtime for 90 days.  Max Daily Amount: 600 mg

## 2023-08-28 RX ORDER — OFLOXACIN 3 MG/ML
SOLUTION/ DROPS OPHTHALMIC
Qty: 15 ML | Refills: 10 | OUTPATIENT
Start: 2023-08-28

## 2023-08-29 ENCOUNTER — OFFICE VISIT (OUTPATIENT)
Dept: FAMILY MEDICINE CLINIC | Age: 73
End: 2023-08-29
Payer: MEDICARE

## 2023-08-29 VITALS
BODY MASS INDEX: 44.73 KG/M2 | WEIGHT: 252.5 LBS | TEMPERATURE: 97.4 F | HEART RATE: 88 BPM | OXYGEN SATURATION: 93 % | SYSTOLIC BLOOD PRESSURE: 132 MMHG | DIASTOLIC BLOOD PRESSURE: 84 MMHG

## 2023-08-29 DIAGNOSIS — E11.42 DIABETIC POLYNEUROPATHY ASSOCIATED WITH TYPE 2 DIABETES MELLITUS (HCC): ICD-10-CM

## 2023-08-29 DIAGNOSIS — L03.116 CELLULITIS OF LEFT LOWER EXTREMITY: ICD-10-CM

## 2023-08-29 DIAGNOSIS — M54.50 LOW BACK PAIN, UNSPECIFIED BACK PAIN LATERALITY, UNSPECIFIED CHRONICITY, UNSPECIFIED WHETHER SCIATICA PRESENT: ICD-10-CM

## 2023-08-29 DIAGNOSIS — G25.81 RLS (RESTLESS LEGS SYNDROME): ICD-10-CM

## 2023-08-29 DIAGNOSIS — J41.0 SIMPLE CHRONIC BRONCHITIS (HCC): Primary | ICD-10-CM

## 2023-08-29 DIAGNOSIS — K64.4 EXTERNAL HEMORRHOID: ICD-10-CM

## 2023-08-29 DIAGNOSIS — Z79.4 CONTROLLED TYPE 2 DIABETES MELLITUS WITH OTHER NEUROLOGIC COMPLICATION, WITH LONG-TERM CURRENT USE OF INSULIN (HCC): ICD-10-CM

## 2023-08-29 DIAGNOSIS — E11.49 CONTROLLED TYPE 2 DIABETES MELLITUS WITH OTHER NEUROLOGIC COMPLICATION, WITH LONG-TERM CURRENT USE OF INSULIN (HCC): ICD-10-CM

## 2023-08-29 DIAGNOSIS — M54.50 ACUTE LOW BACK PAIN, UNSPECIFIED BACK PAIN LATERALITY, UNSPECIFIED WHETHER SCIATICA PRESENT: ICD-10-CM

## 2023-08-29 PROCEDURE — 3023F SPIROM DOC REV: CPT | Performed by: NURSE PRACTITIONER

## 2023-08-29 PROCEDURE — G8427 DOCREV CUR MEDS BY ELIG CLIN: HCPCS | Performed by: NURSE PRACTITIONER

## 2023-08-29 PROCEDURE — 99214 OFFICE O/P EST MOD 30 MIN: CPT | Performed by: NURSE PRACTITIONER

## 2023-08-29 PROCEDURE — 1090F PRES/ABSN URINE INCON ASSESS: CPT | Performed by: NURSE PRACTITIONER

## 2023-08-29 PROCEDURE — 1036F TOBACCO NON-USER: CPT | Performed by: NURSE PRACTITIONER

## 2023-08-29 PROCEDURE — 2022F DILAT RTA XM EVC RTNOPTHY: CPT | Performed by: NURSE PRACTITIONER

## 2023-08-29 PROCEDURE — 3051F HG A1C>EQUAL 7.0%<8.0%: CPT | Performed by: NURSE PRACTITIONER

## 2023-08-29 PROCEDURE — G8400 PT W/DXA NO RESULTS DOC: HCPCS | Performed by: NURSE PRACTITIONER

## 2023-08-29 PROCEDURE — 3017F COLORECTAL CA SCREEN DOC REV: CPT | Performed by: NURSE PRACTITIONER

## 2023-08-29 PROCEDURE — G8417 CALC BMI ABV UP PARAM F/U: HCPCS | Performed by: NURSE PRACTITIONER

## 2023-08-29 PROCEDURE — 1123F ACP DISCUSS/DSCN MKR DOCD: CPT | Performed by: NURSE PRACTITIONER

## 2023-08-29 RX ORDER — TRAMADOL HYDROCHLORIDE 50 MG/1
50 TABLET ORAL EVERY 6 HOURS PRN
Qty: 120 TABLET | Refills: 0 | Status: SHIPPED | OUTPATIENT
Start: 2023-08-29 | End: 2023-09-28

## 2023-08-29 RX ORDER — GABAPENTIN 600 MG/1
600 TABLET ORAL NIGHTLY
Qty: 30 TABLET | Refills: 5 | Status: SHIPPED | OUTPATIENT
Start: 2023-08-29 | End: 2024-02-25

## 2023-08-29 RX ORDER — CLINDAMYCIN HYDROCHLORIDE 300 MG/1
300 CAPSULE ORAL 3 TIMES DAILY
Qty: 30 CAPSULE | Refills: 0 | Status: SHIPPED | OUTPATIENT
Start: 2023-08-29 | End: 2023-09-08

## 2023-08-29 RX ORDER — DIAPER,BRIEF,INFANT-TODD,DISP
EACH MISCELLANEOUS
Qty: 30 G | Refills: 1 | Status: SHIPPED | OUTPATIENT
Start: 2023-08-29 | End: 2023-09-05

## 2023-08-29 RX ORDER — METHYLPREDNISOLONE 4 MG/1
TABLET ORAL
Qty: 1 KIT | Refills: 0 | Status: SHIPPED | OUTPATIENT
Start: 2023-08-29 | End: 2023-09-04

## 2023-08-29 ASSESSMENT — ENCOUNTER SYMPTOMS
WHEEZING: 0
ABDOMINAL PAIN: 0
SORE THROAT: 0
COUGH: 0
SHORTNESS OF BREATH: 0
BACK PAIN: 1

## 2023-08-29 NOTE — PROGRESS NOTES
sleep disturbance (improved on elavil). The patient is not nervous/anxious and is not hyperactive. All other systems reviewed and are negative. Physical Exam  Vitals reviewed. Constitutional:       General: She is not in acute distress. Appearance: Normal appearance. She is not ill-appearing. Cardiovascular:      Rate and Rhythm: Normal rate and regular rhythm. Pulses: Normal pulses. Heart sounds: No murmur heard. Pulmonary:      Effort: Pulmonary effort is normal.      Breath sounds: Normal breath sounds. Comments: Diminished throughout    Skin:     Findings: No lesion (lower extermity left and right lower leg redness) or rash. Neurological:      General: No focal deficit present. Mental Status: She is alert and oriented to person, place, and time. Psychiatric:         Mood and Affect: Mood normal.         Behavior: Behavior normal.                 An electronic signature was used to authenticate this note.     --JIA Rubi

## 2023-08-30 ENCOUNTER — TELEPHONE (OUTPATIENT)
Dept: FAMILY MEDICINE CLINIC | Age: 73
End: 2023-08-30

## 2023-08-30 NOTE — TELEPHONE ENCOUNTER
Received a call from Claribel Albarado with [facility] [place] stating they admitted pt today to home care and the will see her once more this week and then once a week for 3 more weeks. Also they would like to have SW go out to speak with pt regarding living will and POA. Verbal order given.

## 2023-09-22 ENCOUNTER — TELEPHONE (OUTPATIENT)
Dept: FAMILY MEDICINE CLINIC | Age: 73
End: 2023-09-22

## 2023-09-29 ENCOUNTER — TELEPHONE (OUTPATIENT)
Dept: FAMILY MEDICINE CLINIC | Age: 73
End: 2023-09-29

## 2023-09-29 DIAGNOSIS — E11.65 UNCONTROLLED TYPE 2 DIABETES MELLITUS WITH HYPERGLYCEMIA (HCC): ICD-10-CM

## 2023-09-29 DIAGNOSIS — E11.42 DIABETIC POLYNEUROPATHY ASSOCIATED WITH TYPE 2 DIABETES MELLITUS (HCC): ICD-10-CM

## 2023-09-29 DIAGNOSIS — Z00.00 WELL ADULT EXAM: ICD-10-CM

## 2023-09-29 DIAGNOSIS — Z00.00 ANNUAL PHYSICAL EXAM: ICD-10-CM

## 2023-09-29 DIAGNOSIS — Z00.00 ANNUAL PHYSICAL EXAM: Primary | ICD-10-CM

## 2023-09-29 LAB
25(OH)D3 SERPL-MCNC: 34.9 NG/ML
ALBUMIN SERPL-MCNC: 3.5 G/DL (ref 3.5–5.2)
ALP SERPL-CCNC: 97 U/L (ref 35–104)
ALT SERPL-CCNC: 24 U/L (ref 5–33)
ANION GAP SERPL CALCULATED.3IONS-SCNC: 12 MMOL/L (ref 7–19)
AST SERPL-CCNC: 38 U/L (ref 5–32)
BASOPHILS # BLD: 0 K/UL (ref 0–0.2)
BASOPHILS NFR BLD: 1.1 % (ref 0–1)
BILIRUB SERPL-MCNC: 0.4 MG/DL (ref 0.2–1.2)
BUN SERPL-MCNC: 18 MG/DL (ref 8–23)
CALCIUM SERPL-MCNC: 8.8 MG/DL (ref 8.8–10.2)
CHLORIDE SERPL-SCNC: 102 MMOL/L (ref 98–111)
CHOLEST SERPL-MCNC: 135 MG/DL (ref 160–199)
CO2 SERPL-SCNC: 27 MMOL/L (ref 22–29)
CREAT SERPL-MCNC: 1 MG/DL (ref 0.5–0.9)
CREAT UR-MCNC: 94.8 MG/DL (ref 28–217)
EOSINOPHIL # BLD: 0.1 K/UL (ref 0–0.6)
EOSINOPHIL NFR BLD: 3.4 % (ref 0–5)
ERYTHROCYTE [DISTWIDTH] IN BLOOD BY AUTOMATED COUNT: 12.6 % (ref 11.5–14.5)
GLUCOSE SERPL-MCNC: 383 MG/DL (ref 74–109)
HBA1C MFR BLD: 7.3 % (ref 4–6)
HCT VFR BLD AUTO: 39.4 % (ref 37–47)
HDLC SERPL-MCNC: 49 MG/DL (ref 65–121)
HGB BLD-MCNC: 12.8 G/DL (ref 12–16)
IMM GRANULOCYTES # BLD: 0 K/UL
LDLC SERPL CALC-MCNC: 18 MG/DL
LYMPHOCYTES # BLD: 0.8 K/UL (ref 1.1–4.5)
LYMPHOCYTES NFR BLD: 23.4 % (ref 20–40)
MCH RBC QN AUTO: 31.4 PG (ref 27–31)
MCHC RBC AUTO-ENTMCNC: 32.5 G/DL (ref 33–37)
MCV RBC AUTO: 96.6 FL (ref 81–99)
MICROALBUMIN UR-MCNC: <1.2 MG/DL (ref 0–19)
MICROALBUMIN/CREAT UR-RTO: NORMAL MG/G
MONOCYTES # BLD: 0.3 K/UL (ref 0–0.9)
MONOCYTES NFR BLD: 8.5 % (ref 0–10)
NEUTROPHILS # BLD: 2.2 K/UL (ref 1.5–7.5)
NEUTS SEG NFR BLD: 63.3 % (ref 50–65)
PLATELET # BLD AUTO: 88 K/UL (ref 130–400)
PMV BLD AUTO: 11.5 FL (ref 9.4–12.3)
POTASSIUM SERPL-SCNC: 4.3 MMOL/L (ref 3.5–5)
PROT SERPL-MCNC: 6.3 G/DL (ref 6.6–8.7)
RBC # BLD AUTO: 4.08 M/UL (ref 4.2–5.4)
SODIUM SERPL-SCNC: 141 MMOL/L (ref 136–145)
T4 FREE SERPL-MCNC: 1.2 NG/DL (ref 0.93–1.7)
TRIGL SERPL-MCNC: 338 MG/DL (ref 0–149)
TSH SERPL DL<=0.005 MIU/L-ACNC: 1.66 UIU/ML (ref 0.27–4.2)
VIT B12 SERPL-MCNC: 562 PG/ML (ref 211–946)
WBC # BLD AUTO: 3.5 K/UL (ref 4.8–10.8)

## 2023-09-29 NOTE — TELEPHONE ENCOUNTER
Lab called and pt came in and had her lab work done but the orders were not put in due to pt showing up 1 hour after visit and having to RS orders put in

## 2023-10-02 ENCOUNTER — TELEPHONE (OUTPATIENT)
Dept: FAMILY MEDICINE CLINIC | Age: 73
End: 2023-10-02

## 2023-10-02 DIAGNOSIS — D72.829 LEUKOCYTOSIS, UNSPECIFIED TYPE: Primary | ICD-10-CM

## 2023-10-02 DIAGNOSIS — D72.9 ABNORMAL WBC COUNT: ICD-10-CM

## 2023-10-02 DIAGNOSIS — D69.1 ABNORMAL PLATELETS (HCC): ICD-10-CM

## 2023-10-02 NOTE — TELEPHONE ENCOUNTER
----- Message from JIA Tolbert sent at 10/2/2023  8:17 AM CDT -----  Lipids at goal great job elevated triglycerides   Next check be fasting  Cmp electrolytes liver and kidneywnl  Glucose 383 use dexcom for tighter control  A1c 7.3 recheck in 3 months   Cbc mild decrease wbc and platlets recheck in 1 month   B12 wnl  Vitamin d normal  Thyroid wnl  Urine good no abnormal proteinnoted

## 2023-10-02 NOTE — TELEPHONE ENCOUNTER
Patient answered I confirmed patient then read off results but by the time I finished giving results I had asked if she had any questions and realized she hung up on me.  I am not sure how much of it patient heard so I will give a call back later to review results

## 2023-10-05 DIAGNOSIS — E11.69 TYPE 2 DIABETES MELLITUS WITH OTHER SPECIFIED COMPLICATION, WITH LONG-TERM CURRENT USE OF INSULIN (HCC): ICD-10-CM

## 2023-10-05 DIAGNOSIS — Z79.4 TYPE 2 DIABETES MELLITUS WITH OTHER SPECIFIED COMPLICATION, WITH LONG-TERM CURRENT USE OF INSULIN (HCC): ICD-10-CM

## 2023-10-06 RX ORDER — INSULIN GLARGINE 300 U/ML
INJECTION, SOLUTION SUBCUTANEOUS
Qty: 6 ML | Refills: 10 | Status: SHIPPED | OUTPATIENT
Start: 2023-10-06

## 2023-10-06 NOTE — TELEPHONE ENCOUNTER
Received fax from pharmacy requesting refill on pts medication(s). Pt was last seen in office on 8/29/2023  and has a follow up scheduled for 10/13/2023. Will send request to  Aniyah Reyes  for authorization.      Requested Prescriptions     Pending Prescriptions Disp Refills    Insulin Glargine, 2 Unit Dial, (TOUJEO MAX SOLOSTAR) 300 UNIT/ML SOPN [Pharmacy Med Name: TOUJEO MAX SOLOSTAR 300U*6 300 Solution Pen-injector] 6 mL 10     Sig: INJECT 30 UNITS SUBCUTANEOUSLY IN THE MORNING AND 20 UNITS AT NIGHT

## 2023-10-13 ENCOUNTER — OFFICE VISIT (OUTPATIENT)
Dept: FAMILY MEDICINE CLINIC | Age: 73
End: 2023-10-13

## 2023-10-13 VITALS
HEIGHT: 63 IN | OXYGEN SATURATION: 92 % | WEIGHT: 257.25 LBS | BODY MASS INDEX: 45.58 KG/M2 | DIASTOLIC BLOOD PRESSURE: 68 MMHG | SYSTOLIC BLOOD PRESSURE: 112 MMHG | HEART RATE: 91 BPM | TEMPERATURE: 97.8 F

## 2023-10-13 DIAGNOSIS — E11.69 TYPE 2 DIABETES MELLITUS WITH OTHER SPECIFIED COMPLICATION, WITH LONG-TERM CURRENT USE OF INSULIN (HCC): ICD-10-CM

## 2023-10-13 DIAGNOSIS — Z00.00 MEDICARE ANNUAL WELLNESS VISIT, SUBSEQUENT: Primary | ICD-10-CM

## 2023-10-13 DIAGNOSIS — S81.812A SKIN TEAR OF LEFT LOWER LEG WITHOUT COMPLICATION, INITIAL ENCOUNTER: ICD-10-CM

## 2023-10-13 DIAGNOSIS — L03.116 CELLULITIS OF LEFT LOWER EXTREMITY: ICD-10-CM

## 2023-10-13 DIAGNOSIS — F33.42 RECURRENT MAJOR DEPRESSIVE DISORDER, IN FULL REMISSION (HCC): ICD-10-CM

## 2023-10-13 DIAGNOSIS — Z79.4 CONTROLLED TYPE 2 DIABETES MELLITUS WITHOUT COMPLICATION, WITH LONG-TERM CURRENT USE OF INSULIN (HCC): ICD-10-CM

## 2023-10-13 DIAGNOSIS — M54.50 ACUTE LOW BACK PAIN, UNSPECIFIED BACK PAIN LATERALITY, UNSPECIFIED WHETHER SCIATICA PRESENT: ICD-10-CM

## 2023-10-13 DIAGNOSIS — L01.00 IMPETIGO: ICD-10-CM

## 2023-10-13 DIAGNOSIS — G47.01 INSOMNIA DUE TO MEDICAL CONDITION: ICD-10-CM

## 2023-10-13 DIAGNOSIS — J41.0 SIMPLE CHRONIC BRONCHITIS (HCC): ICD-10-CM

## 2023-10-13 DIAGNOSIS — E11.9 CONTROLLED TYPE 2 DIABETES MELLITUS WITHOUT COMPLICATION, WITH LONG-TERM CURRENT USE OF INSULIN (HCC): ICD-10-CM

## 2023-10-13 DIAGNOSIS — Z79.4 TYPE 2 DIABETES MELLITUS WITH OTHER SPECIFIED COMPLICATION, WITH LONG-TERM CURRENT USE OF INSULIN (HCC): ICD-10-CM

## 2023-10-13 RX ORDER — TRAMADOL HYDROCHLORIDE 50 MG/1
50 TABLET ORAL EVERY 6 HOURS PRN
Qty: 120 TABLET | Refills: 0 | Status: SHIPPED | OUTPATIENT
Start: 2023-10-13 | End: 2023-11-12

## 2023-10-13 RX ORDER — DULAGLUTIDE 3 MG/.5ML
3 INJECTION, SOLUTION SUBCUTANEOUS WEEKLY
Qty: 12 ADJUSTABLE DOSE PRE-FILLED PEN SYRINGE | Refills: 3 | Status: SHIPPED | OUTPATIENT
Start: 2023-10-13

## 2023-10-13 RX ORDER — CLINDAMYCIN HYDROCHLORIDE 300 MG/1
300 CAPSULE ORAL 3 TIMES DAILY
Qty: 30 CAPSULE | Refills: 0 | Status: SHIPPED | OUTPATIENT
Start: 2023-10-13 | End: 2023-10-23

## 2023-10-13 RX ORDER — AMITRIPTYLINE HYDROCHLORIDE 10 MG/1
10 TABLET, FILM COATED ORAL NIGHTLY
Qty: 90 TABLET | Refills: 3 | Status: SHIPPED | OUTPATIENT
Start: 2023-10-13

## 2023-10-13 ASSESSMENT — PATIENT HEALTH QUESTIONNAIRE - PHQ9
1. LITTLE INTEREST OR PLEASURE IN DOING THINGS: 0
7. TROUBLE CONCENTRATING ON THINGS, SUCH AS READING THE NEWSPAPER OR WATCHING TELEVISION: 0
3. TROUBLE FALLING OR STAYING ASLEEP: 0
2. FEELING DOWN, DEPRESSED OR HOPELESS: 1
SUM OF ALL RESPONSES TO PHQ QUESTIONS 1-9: 1
SUM OF ALL RESPONSES TO PHQ QUESTIONS 1-9: 1
8. MOVING OR SPEAKING SO SLOWLY THAT OTHER PEOPLE COULD HAVE NOTICED. OR THE OPPOSITE, BEING SO FIGETY OR RESTLESS THAT YOU HAVE BEEN MOVING AROUND A LOT MORE THAN USUAL: 0
10. IF YOU CHECKED OFF ANY PROBLEMS, HOW DIFFICULT HAVE THESE PROBLEMS MADE IT FOR YOU TO DO YOUR WORK, TAKE CARE OF THINGS AT HOME, OR GET ALONG WITH OTHER PEOPLE: 0
5. POOR APPETITE OR OVEREATING: 0
4. FEELING TIRED OR HAVING LITTLE ENERGY: 0
SUM OF ALL RESPONSES TO PHQ QUESTIONS 1-9: 1
9. THOUGHTS THAT YOU WOULD BE BETTER OFF DEAD, OR OF HURTING YOURSELF: 0
SUM OF ALL RESPONSES TO PHQ9 QUESTIONS 1 & 2: 1
SUM OF ALL RESPONSES TO PHQ QUESTIONS 1-9: 1
6. FEELING BAD ABOUT YOURSELF - OR THAT YOU ARE A FAILURE OR HAVE LET YOURSELF OR YOUR FAMILY DOWN: 0

## 2023-10-13 NOTE — PROGRESS NOTES
Medicare Annual Wellness Visit    Baldwin Crigler is here for Medicare AWV    Assessment & Plan   Medicare annual wellness visit, subsequent  Doing well  Cont present    Controlled type 2 diabetes mellitus without complication, with long-term current use of insulin ([address])  Cont labs  And CGM for glucose monitor    Recurrent major depressive disorder, in full remission ([address])  Trintellex at goal    Type 2 diabetes mellitus with other specified complication, with long-term current use of insulin ([facility])  -     Dulaglutide (TRULICITY) 3 IW/6.5AE SOPN; Inject 3 mg into the skin once a week, Disp-12 Adjustable Dose Pre-filled Pen Syringe, R-3Normal  -     traMADol (ULTRAM) 50 MG tablet; Take 1 tablet by mouth every 6 hours as needed for Pain for up to 30 days. Intended supply: 3 days. Take lowest dose possible to manage pain Max Daily Amount: 200 mg, Disp-120 tablet, R-0Normal  Acute low back pain, unspecified back pain laterality, unspecified whether sciatica present  -     traMADol (ULTRAM) 50 MG tablet; Take 1 tablet by mouth every 6 hours as needed for Pain for up to 30 days. Intended supply: 3 days. Take lowest dose possible to manage pain Max Daily Amount: 200 mg, Disp-120 tablet, R-0Normal  Cellulitis of left lower extremity  -     clindamycin (CLEOCIN) 300 MG capsule; Take 1 capsule by mouth 3 times daily for 10 days, Disp-30 capsule, R-0Normal  Simple chronic bronchitis (HCC)  -     clindamycin (CLEOCIN) 300 MG capsule; Take 1 capsule by mouth 3 times daily for 10 days, Disp-30 capsule, R-0Normal  Skin tear of left lower leg without complication, initial encounter  -     silver sulfADIAZINE (SILVADENE) 1 % cream; Apply topically daily. , Disp-50 g, R-2, Normal  Impetigo  -     mupirocin (BACTROBAN) 2 % ointment; APPLY TOPICALLY TO AFFECTED AREAS THREE TIMES A DAY, Disp-30 g, R-10, Normal  Insomnia due to medical condition  Cont at bedtime    -     amitriptyline (ELAVIL) 10 MG tablet;  Take 1 tablet by mouth nightly,

## 2023-10-13 NOTE — PATIENT INSTRUCTIONS
adapted under license by [facility] ([facility]). If you have questions about a medical condition or this instruction, always ask your healthcare professional. [address] any warranty or liability for your use of this information. Learning About Activities of Daily Living  What are activities of daily living? Activities of daily living (ADLs) are the basic self-care tasks you do every day. As you age, and if you have health problems, you may find that it's harder to do these things for yourself. That's when you may need some help. Your doctor uses ADLs to measure how much help you need. Knowing what you can and can't do for yourself is an important first step to getting help. And when you have the help you need, you can stay as independent as possible. Your doctor will want to know if you are able to do tasks such as: Take a bath or shower without help. Go to the bathroom by yourself. Dress and undress without help. Shave, comb your hair, and brush teeth on your own. Get in and out of bed or a chair without help. Feed yourself without help. If you are having trouble doing basic self-care tasks, talk with your doctor. You may want to bring a caregiver or family member who can help the doctor understand your needs and abilities. How will a doctor assess your ADLs? Asking about ADLs is part of a routine health checkup your doctor will likely do as you age. Your health check might be done in a doctor's office, in your home, or at a hospital. The goal is to find out if you are having any problems that could make your health problems worse or that make it unsafe for you to be on your own. To measure your ADLs, your doctor will ask how hard it is for you to do routine tasks. He or she may also want to know if you have changed the way you do a task because of a health problem. He or she may watch how you:  Walk back and forth. Keep your balance while you stand or walk.   Move from

## 2023-10-25 DIAGNOSIS — G47.01 INSOMNIA DUE TO MEDICAL CONDITION: ICD-10-CM

## 2023-10-26 RX ORDER — AMITRIPTYLINE HYDROCHLORIDE 10 MG/1
10 TABLET, FILM COATED ORAL NIGHTLY
Qty: 30 TABLET | Refills: 5 | OUTPATIENT
Start: 2023-10-26

## 2023-11-27 DIAGNOSIS — E11.69 TYPE 2 DIABETES MELLITUS WITH OTHER SPECIFIED COMPLICATION, WITH LONG-TERM CURRENT USE OF INSULIN (HCC): ICD-10-CM

## 2023-11-27 DIAGNOSIS — Z79.4 TYPE 2 DIABETES MELLITUS WITH OTHER SPECIFIED COMPLICATION, WITH LONG-TERM CURRENT USE OF INSULIN (HCC): ICD-10-CM

## 2023-11-27 DIAGNOSIS — G47.01 INSOMNIA DUE TO MEDICAL CONDITION: ICD-10-CM

## 2023-11-27 RX ORDER — AMITRIPTYLINE HYDROCHLORIDE 10 MG/1
10 TABLET, FILM COATED ORAL NIGHTLY
Qty: 30 TABLET | Refills: 11 | Status: SHIPPED | OUTPATIENT
Start: 2023-11-27

## 2023-11-27 RX ORDER — DULAGLUTIDE 3 MG/.5ML
INJECTION, SOLUTION SUBCUTANEOUS
Qty: 2 ML | Refills: 11 | Status: SHIPPED | OUTPATIENT
Start: 2023-11-27

## 2023-11-27 NOTE — TELEPHONE ENCOUNTER
Received fax from pharmacy requesting refill on pts medication(s). Pt was last seen in office on 10/13/2023  and has a follow up scheduled for 12/14/2023. Will send request to  Roberto Antoine  for patient.      Requested Prescriptions     Pending Prescriptions Disp Refills    amitriptyline (ELAVIL) 10 MG tablet [Pharmacy Med Name: AMITRIPTYLINE HCL 10 MG TAB 10 Tablet] 30 tablet 11     Sig: Take 1 tablet by mouth nightly    Dulaglutide (TRULICITY) 3 NS/7.6JQ SOPN [Pharmacy Med Name: Era Garcia 3 FP/5.6HD SOPN 3 Solution Pen-injector] 2 mL 11     Sig: INJECT 3 UNITS SUBCUTANEOUSLY ONCE WEEKLY AS DIRECTED

## 2023-12-14 ENCOUNTER — OFFICE VISIT (OUTPATIENT)
Dept: FAMILY MEDICINE CLINIC | Age: 73
End: 2023-12-14
Payer: MEDICARE

## 2023-12-14 VITALS
WEIGHT: 270.5 LBS | HEART RATE: 110 BPM | TEMPERATURE: 98.3 F | DIASTOLIC BLOOD PRESSURE: 76 MMHG | SYSTOLIC BLOOD PRESSURE: 126 MMHG | OXYGEN SATURATION: 93 % | BODY MASS INDEX: 47.92 KG/M2

## 2023-12-14 DIAGNOSIS — G47.01 INSOMNIA DUE TO MEDICAL CONDITION: ICD-10-CM

## 2023-12-14 DIAGNOSIS — E11.9 CONTROLLED TYPE 2 DIABETES MELLITUS WITHOUT COMPLICATION, WITH LONG-TERM CURRENT USE OF INSULIN (HCC): ICD-10-CM

## 2023-12-14 DIAGNOSIS — E03.4 HYPOTHYROIDISM DUE TO ACQUIRED ATROPHY OF THYROID: ICD-10-CM

## 2023-12-14 DIAGNOSIS — M54.50 ACUTE LOW BACK PAIN, UNSPECIFIED BACK PAIN LATERALITY, UNSPECIFIED WHETHER SCIATICA PRESENT: ICD-10-CM

## 2023-12-14 DIAGNOSIS — Z79.4 CONTROLLED TYPE 2 DIABETES MELLITUS WITHOUT COMPLICATION, WITH LONG-TERM CURRENT USE OF INSULIN (HCC): ICD-10-CM

## 2023-12-14 DIAGNOSIS — Z79.4 CONTROLLED TYPE 2 DIABETES MELLITUS WITH OTHER NEUROLOGIC COMPLICATION, WITH LONG-TERM CURRENT USE OF INSULIN (HCC): ICD-10-CM

## 2023-12-14 DIAGNOSIS — G25.81 RLS (RESTLESS LEGS SYNDROME): ICD-10-CM

## 2023-12-14 DIAGNOSIS — E78.2 MIXED HYPERLIPIDEMIA: ICD-10-CM

## 2023-12-14 DIAGNOSIS — Z79.4 CONTROLLED TYPE 2 DIABETES MELLITUS WITHOUT COMPLICATION, WITH LONG-TERM CURRENT USE OF INSULIN (HCC): Primary | ICD-10-CM

## 2023-12-14 DIAGNOSIS — E11.42 DIABETIC POLYNEUROPATHY ASSOCIATED WITH TYPE 2 DIABETES MELLITUS (HCC): ICD-10-CM

## 2023-12-14 DIAGNOSIS — E11.9 CONTROLLED TYPE 2 DIABETES MELLITUS WITHOUT COMPLICATION, WITH LONG-TERM CURRENT USE OF INSULIN (HCC): Primary | ICD-10-CM

## 2023-12-14 DIAGNOSIS — E11.49 CONTROLLED TYPE 2 DIABETES MELLITUS WITH OTHER NEUROLOGIC COMPLICATION, WITH LONG-TERM CURRENT USE OF INSULIN (HCC): ICD-10-CM

## 2023-12-14 LAB
ALBUMIN SERPL-MCNC: 3.8 G/DL (ref 3.5–5.2)
ALP SERPL-CCNC: 103 U/L (ref 35–104)
ALT SERPL-CCNC: 33 U/L (ref 5–33)
ANION GAP SERPL CALCULATED.3IONS-SCNC: 9 MMOL/L (ref 7–19)
AST SERPL-CCNC: 31 U/L (ref 5–32)
BASOPHILS # BLD: 0 K/UL (ref 0–0.2)
BASOPHILS NFR BLD: 0.9 % (ref 0–1)
BILIRUB SERPL-MCNC: 0.5 MG/DL (ref 0.2–1.2)
BUN SERPL-MCNC: 15 MG/DL (ref 8–23)
CALCIUM SERPL-MCNC: 9.1 MG/DL (ref 8.8–10.2)
CHLORIDE SERPL-SCNC: 102 MMOL/L (ref 98–111)
CO2 SERPL-SCNC: 28 MMOL/L (ref 22–29)
CREAT SERPL-MCNC: 1 MG/DL (ref 0.5–0.9)
CREAT UR-MCNC: 107.1 MG/DL (ref 28–217)
EOSINOPHIL # BLD: 0.1 K/UL (ref 0–0.6)
EOSINOPHIL NFR BLD: 3.1 % (ref 0–5)
ERYTHROCYTE [DISTWIDTH] IN BLOOD BY AUTOMATED COUNT: 12.4 % (ref 11.5–14.5)
GLUCOSE SERPL-MCNC: 165 MG/DL (ref 74–109)
HBA1C MFR BLD: 8.3 % (ref 4–6)
HCT VFR BLD AUTO: 40.1 % (ref 37–47)
HGB BLD-MCNC: 13.4 G/DL (ref 12–16)
IMM GRANULOCYTES # BLD: 0 K/UL
LYMPHOCYTES # BLD: 1 K/UL (ref 1.1–4.5)
LYMPHOCYTES NFR BLD: 24 % (ref 20–40)
MCH RBC QN AUTO: 32 PG (ref 27–31)
MCHC RBC AUTO-ENTMCNC: 33.4 G/DL (ref 33–37)
MCV RBC AUTO: 95.7 FL (ref 81–99)
MICROALBUMIN UR-MCNC: <1.2 MG/DL (ref 0–19)
MICROALBUMIN/CREAT UR-RTO: NORMAL MG/G
MONOCYTES # BLD: 0.3 K/UL (ref 0–0.9)
MONOCYTES NFR BLD: 6.8 % (ref 0–10)
NEUTROPHILS # BLD: 2.8 K/UL (ref 1.5–7.5)
NEUTS SEG NFR BLD: 65 % (ref 50–65)
PLATELET # BLD AUTO: 93 K/UL (ref 130–400)
PMV BLD AUTO: 11.3 FL (ref 9.4–12.3)
POTASSIUM SERPL-SCNC: 4.3 MMOL/L (ref 3.5–5)
PROT SERPL-MCNC: 6.4 G/DL (ref 6.6–8.7)
RBC # BLD AUTO: 4.19 M/UL (ref 4.2–5.4)
SODIUM SERPL-SCNC: 139 MMOL/L (ref 136–145)
WBC # BLD AUTO: 4.3 K/UL (ref 4.8–10.8)

## 2023-12-14 PROCEDURE — 1036F TOBACCO NON-USER: CPT | Performed by: NURSE PRACTITIONER

## 2023-12-14 PROCEDURE — G8417 CALC BMI ABV UP PARAM F/U: HCPCS | Performed by: NURSE PRACTITIONER

## 2023-12-14 PROCEDURE — G8400 PT W/DXA NO RESULTS DOC: HCPCS | Performed by: NURSE PRACTITIONER

## 2023-12-14 PROCEDURE — 1090F PRES/ABSN URINE INCON ASSESS: CPT | Performed by: NURSE PRACTITIONER

## 2023-12-14 PROCEDURE — 3051F HG A1C>EQUAL 7.0%<8.0%: CPT | Performed by: NURSE PRACTITIONER

## 2023-12-14 PROCEDURE — G8427 DOCREV CUR MEDS BY ELIG CLIN: HCPCS | Performed by: NURSE PRACTITIONER

## 2023-12-14 PROCEDURE — 3017F COLORECTAL CA SCREEN DOC REV: CPT | Performed by: NURSE PRACTITIONER

## 2023-12-14 PROCEDURE — G8484 FLU IMMUNIZE NO ADMIN: HCPCS | Performed by: NURSE PRACTITIONER

## 2023-12-14 PROCEDURE — 99214 OFFICE O/P EST MOD 30 MIN: CPT | Performed by: NURSE PRACTITIONER

## 2023-12-14 PROCEDURE — 1123F ACP DISCUSS/DSCN MKR DOCD: CPT | Performed by: NURSE PRACTITIONER

## 2023-12-14 PROCEDURE — 2022F DILAT RTA XM EVC RTNOPTHY: CPT | Performed by: NURSE PRACTITIONER

## 2023-12-14 RX ORDER — TRAMADOL HYDROCHLORIDE 50 MG/1
50 TABLET ORAL EVERY 6 HOURS PRN
Qty: 120 TABLET | Refills: 0 | Status: SHIPPED | OUTPATIENT
Start: 2023-12-14 | End: 2024-01-13

## 2023-12-14 RX ORDER — AMITRIPTYLINE HYDROCHLORIDE 25 MG/1
25 TABLET, FILM COATED ORAL NIGHTLY
Qty: 30 TABLET | Refills: 11 | Status: SHIPPED | OUTPATIENT
Start: 2023-12-14

## 2023-12-14 RX ORDER — LOSARTAN POTASSIUM 25 MG/1
25 TABLET ORAL DAILY
Qty: 30 TABLET | Refills: 11 | Status: SHIPPED | OUTPATIENT
Start: 2023-12-14

## 2023-12-14 RX ORDER — GABAPENTIN 600 MG/1
600 TABLET ORAL NIGHTLY
Qty: 30 TABLET | Refills: 5 | Status: SHIPPED | OUTPATIENT
Start: 2023-12-14 | End: 2024-06-11

## 2023-12-14 RX ORDER — GABAPENTIN 300 MG/1
300 CAPSULE ORAL 2 TIMES DAILY
Qty: 60 CAPSULE | Refills: 5 | Status: SHIPPED | OUTPATIENT
Start: 2023-12-14 | End: 2024-06-11

## 2023-12-14 RX ORDER — OXYBUTYNIN CHLORIDE 5 MG/1
5 TABLET ORAL DAILY
Qty: 90 TABLET | Refills: 3 | Status: SHIPPED | OUTPATIENT
Start: 2023-12-14

## 2023-12-14 NOTE — PROGRESS NOTES
Tato Douglas (:  1950) is a 68 y.o. female,Established patient, here for evaluation of the following chief complaint(s):  Follow-up (For diabetes)      ASSESSMENT/PLAN:    ICD-10-CM    1. Controlled type 2 diabetes mellitus without complication, with long-term current use of insulin ([facility])  E11.9 Hemoglobin A1C    Z79.4 Microalbumin / Creatinine Urine Ratio     Comprehensive Metabolic Panel     CBC with Auto Differential  Cont CGM   Doing well on monitor      2. Hypothyroidism due to acquired atrophy of thyroid  E03.4 At goal  Cont present regimen      3. Mixed hyperlipidemia  E78.2 Zocor nightly        4. RLS (restless legs syndrome)  G25.81 gabapentin (NEURONTIN) 300 MG capsule     gabapentin (NEURONTIN) 600 MG tablet      5. Controlled type 2 diabetes mellitus with other neurologic complication, with long-term current use of insulin ([facility])  E11.49 gabapentin (NEURONTIN) 300 MG capsule  Twice a day  And 600mg at bedtime    Z79.4       6. Diabetic polyneuropathy associated with type 2 diabetes mellitus ([facility])  E11.42 gabapentin (NEURONTIN) 600 MG tablet      7. Acute low back pain, unspecified back pain laterality, unspecified whether sciatica present  M54.50 traMADol (ULTRAM) 50 MG tablet  Twice a day  Doing well            No follow-ups on file.     SUBJECTIVE/OBJECTIVE:  HPI  Patient is here for 2 month follow up    Diabetes Mellitus Type 2        Diet compliance:  compliant all of the time  Nutrition Consultation Needed:  no  Medication:             humalog 20  units three times a day  Trulicity 3mg  weekly  Toujeo 30 units am and 20 pm  ( wants twice a day )   Using her CGM and doing much better     Medication compliance:  compliant all of the time  Weight trend: up 5 lbs  Current exercise: no  Checkin times daily to CGM  Home blood sugar records:  CGM readings 130-200s  Low BG:  no  Eye exam current (within one year): no  Checking Feet regularly:  yes - no issues  ACE/ARB:  no  Aspirin:

## 2024-01-08 DIAGNOSIS — E11.49 CONTROLLED TYPE 2 DIABETES MELLITUS WITH OTHER NEUROLOGIC COMPLICATION, WITH LONG-TERM CURRENT USE OF INSULIN (HCC): ICD-10-CM

## 2024-01-08 DIAGNOSIS — E11.42 DIABETIC POLYNEUROPATHY ASSOCIATED WITH TYPE 2 DIABETES MELLITUS (HCC): ICD-10-CM

## 2024-01-08 DIAGNOSIS — Z79.4 CONTROLLED TYPE 2 DIABETES MELLITUS WITH OTHER NEUROLOGIC COMPLICATION, WITH LONG-TERM CURRENT USE OF INSULIN (HCC): ICD-10-CM

## 2024-01-08 DIAGNOSIS — G25.81 RLS (RESTLESS LEGS SYNDROME): ICD-10-CM

## 2024-01-08 RX ORDER — GABAPENTIN 600 MG/1
600 TABLET ORAL NIGHTLY
Qty: 90 TABLET | Refills: 0 | Status: SHIPPED | OUTPATIENT
Start: 2024-01-08 | End: 2024-04-07

## 2024-01-08 RX ORDER — GABAPENTIN 300 MG/1
300 CAPSULE ORAL 2 TIMES DAILY
Qty: 180 CAPSULE | Refills: 0 | Status: SHIPPED | OUTPATIENT
Start: 2024-01-08 | End: 2024-04-07

## 2024-01-08 NOTE — TELEPHONE ENCOUNTER
Pts daughter called, states that she just moved to [facility] and needs her gabapentin sent in down there. Didn't know if you would/could do this. It was sent in last month.    [facility] phone: [phone]

## 2024-01-16 DIAGNOSIS — G25.81 RLS (RESTLESS LEGS SYNDROME): ICD-10-CM

## 2024-01-16 DIAGNOSIS — E11.42 DIABETIC POLYNEUROPATHY ASSOCIATED WITH TYPE 2 DIABETES MELLITUS (HCC): ICD-10-CM

## 2024-01-16 RX ORDER — GABAPENTIN 600 MG/1
600 TABLET ORAL NIGHTLY
Qty: 90 TABLET | Refills: 0 | Status: SHIPPED | OUTPATIENT
Start: 2024-01-16 | End: 2024-04-15

## 2024-01-16 NOTE — TELEPHONE ENCOUNTER
Patient called requesting to speak to JIA Michel I let her know she is currently on lunch and asked to take a message     She stated she moved and is no longer under Nikkis care but wants to let her know how she is doing     She would not allow me to take a message

## 2024-01-16 NOTE — TELEPHONE ENCOUNTER
[facility] needs [facility] to state that the 600mg is in addition to the 300mg bid in order to fill it.    Received call/My Chart Message from patient requesting refill on medication(s). Pt was last seen in office on 12/14/2023  and has a follow up scheduled for Visit date not found. Will send request to provider for authorization.     Requested Prescriptions     Pending Prescriptions Disp Refills    gabapentin (NEURONTIN) 600 MG tablet 90 tablet 0     Sig: Take 1 tablet by mouth at bedtime for 90 days. In addition to the 300mg BID Max Daily Amount: 600 mg

## 2024-02-08 ENCOUNTER — TELEPHONE (OUTPATIENT)
Dept: FAMILY MEDICINE CLINIC | Age: 74
End: 2024-02-08

## 2024-02-08 NOTE — TELEPHONE ENCOUNTER
MakennaRN with [facility] called. Pt is living in [place] now and has an appt next month with her new PCP. Wants to know if you will sign off on her HH orders until she sees them.

## 2024-02-09 NOTE — TELEPHONE ENCOUNTER
We did NOT order this. It was another provider in [place].    Tried to call Monika back and she is not at her desk. Left message for her to call us back

## 2024-02-26 DIAGNOSIS — R05.1 ACUTE COUGH: ICD-10-CM

## 2024-02-26 DIAGNOSIS — B96.89 ACUTE BACTERIAL SINUSITIS: ICD-10-CM

## 2024-02-26 DIAGNOSIS — J01.90 ACUTE BACTERIAL SINUSITIS: ICD-10-CM

## 2024-02-26 RX ORDER — ALBUTEROL SULFATE 90 UG/1
AEROSOL, METERED RESPIRATORY (INHALATION)
Qty: 18 G | Refills: 0 | Status: SHIPPED | OUTPATIENT
Start: 2024-02-26

## 2024-02-26 NOTE — TELEPHONE ENCOUNTER
Received fax from pharmacy requesting refill on pts medication(s). Pt was last seen in office on 12/14/2023  and has a follow up scheduled for Visit date not found. Will send request to  Stephanie Shell  for authorization.     Requested Prescriptions     Pending Prescriptions Disp Refills    albuterol sulfate HFA (PROVENTIL;VENTOLIN;PROAIR) 108 (90 Base) MCG/ACT inhaler [Pharmacy Med Name: Albuterol Sulfate  (90 Base) MCG/ACT Inhalation Aerosol Solution] 18 g 0     Sig: INHALE 2 PUFFS BY MOUTH 4 TIMES DAILY AS NEEDED FOR WHEEZING

## 2024-04-11 DIAGNOSIS — J01.90 ACUTE BACTERIAL SINUSITIS: ICD-10-CM

## 2024-04-11 DIAGNOSIS — R05.1 ACUTE COUGH: ICD-10-CM

## 2024-04-11 DIAGNOSIS — B96.89 ACUTE BACTERIAL SINUSITIS: ICD-10-CM

## 2024-04-11 RX ORDER — ALBUTEROL SULFATE 90 UG/1
AEROSOL, METERED RESPIRATORY (INHALATION)
Qty: 18 G | Refills: 0 | Status: SHIPPED | OUTPATIENT
Start: 2024-04-11

## 2024-04-15 DIAGNOSIS — K21.9 GASTROESOPHAGEAL REFLUX DISEASE WITHOUT ESOPHAGITIS: ICD-10-CM

## 2024-04-15 RX ORDER — PANTOPRAZOLE SODIUM 40 MG/1
40 TABLET, DELAYED RELEASE ORAL DAILY
Qty: 30 TABLET | Refills: 11 | Status: SHIPPED | OUTPATIENT
Start: 2024-04-15 | End: 2024-04-16 | Stop reason: SDUPTHER

## 2024-04-15 NOTE — TELEPHONE ENCOUNTER
Received fax from pharmacy requesting refill on pts medication(s). Pt was last seen in office on 12/14/2023  and has a follow up scheduled for Visit date not found. Will send request to  Stephanie Shell  for authorization.     Requested Prescriptions     Pending Prescriptions Disp Refills    pantoprazole (PROTONIX) 40 MG tablet [Pharmacy Med Name: pantoprazole 40 mg tablet,delayed release] 30 tablet 11     Sig: TAKE ONE TABLET BY MOUTH DAILY

## 2024-04-16 RX ORDER — PANTOPRAZOLE SODIUM 40 MG/1
40 TABLET, DELAYED RELEASE ORAL DAILY
Qty: 90 TABLET | Refills: 3 | Status: SHIPPED | OUTPATIENT
Start: 2024-04-16

## 2024-06-24 DIAGNOSIS — R79.0 LOW MAGNESIUM LEVEL: ICD-10-CM

## 2024-06-24 DIAGNOSIS — E11.51 DIABETES TYPE 2 WITH ATHEROSCLEROSIS OF ARTERIES OF EXTREMITIES (HCC): ICD-10-CM

## 2024-06-24 DIAGNOSIS — I70.209 DIABETES TYPE 2 WITH ATHEROSCLEROSIS OF ARTERIES OF EXTREMITIES (HCC): ICD-10-CM

## 2024-06-24 RX ORDER — PROCHLORPERAZINE 25 MG/1
SUPPOSITORY RECTAL
Qty: 4 EACH | Refills: 11 | OUTPATIENT
Start: 2024-06-24

## 2024-06-24 RX ORDER — MAGNESIUM OXIDE TAB 400 MG (241.3 MG ELEMENTAL MG) 400 (241.3 MG) MG
400 TAB ORAL DAILY
Qty: 30 TABLET | Refills: 11 | OUTPATIENT
Start: 2024-06-24

## 2024-07-22 DIAGNOSIS — R79.0 LOW MAGNESIUM LEVEL: ICD-10-CM

## 2024-07-22 RX ORDER — MAGNESIUM OXIDE TAB 400 MG (241.3 MG ELEMENTAL MG) 400 (241.3 MG) MG
400 TAB ORAL DAILY
Qty: 30 TABLET | Refills: 11 | OUTPATIENT
Start: 2024-07-22

## 2024-08-13 DIAGNOSIS — E11.65 UNCONTROLLED TYPE 2 DIABETES MELLITUS WITH HYPERGLYCEMIA (HCC): ICD-10-CM

## 2024-08-13 RX ORDER — PEN NEEDLE, DIABETIC 32GX 5/32"
NEEDLE, DISPOSABLE MISCELLANEOUS
Qty: 400 EACH | Refills: 5 | Status: SHIPPED | OUTPATIENT
Start: 2024-08-13

## 2024-08-13 NOTE — TELEPHONE ENCOUNTER
Received fax from pharmacy requesting refill on pts medication(s). Pt was last seen in office on 12/14/2023  and has a follow up scheduled for Visit date not found. Will send request to  Stephanie Shell  for authorization.     Requested Prescriptions     Pending Prescriptions Disp Refills    RELION PEN NEEDLES 32G X 4 MM MISC [Pharmacy Med Name: RELION PEN NEEDLE [id number] MIS] 400 each 11     Sig: USE 1 PEN NEEDLE WITH INSULIN UP TO 5 TIMES A DAY

## 2024-08-23 DIAGNOSIS — I70.209 DIABETES TYPE 2 WITH ATHEROSCLEROSIS OF ARTERIES OF EXTREMITIES (HCC): ICD-10-CM

## 2024-08-23 DIAGNOSIS — E11.51 DIABETES TYPE 2 WITH ATHEROSCLEROSIS OF ARTERIES OF EXTREMITIES (HCC): ICD-10-CM

## 2024-08-23 RX ORDER — PROCHLORPERAZINE 25 MG/1
SUPPOSITORY RECTAL
Qty: 1 EACH | Refills: 0 | Status: SHIPPED | OUTPATIENT
Start: 2024-08-23

## 2024-08-23 RX ORDER — PROCHLORPERAZINE 25 MG/1
SUPPOSITORY RECTAL
Qty: 6 EACH | Refills: 0 | Status: SHIPPED | OUTPATIENT
Start: 2024-08-23

## 2024-08-23 NOTE — TELEPHONE ENCOUNTER
Received fax from pharmacy requesting refill on pts medication(s). Pt was last seen in office on 12/14/2023  and has a follow up scheduled for Visit date not found. Will send request to  Stephanie Shell  for patient.     Requested Prescriptions     Pending Prescriptions Disp Refills    Continuous Glucose Sensor (DEXCOM G6 SENSOR) MISC [Pharmacy Med Name: DEXCOM G6 SENSOR    MIS] 6 each 0     Sig: USE AS DIRECTED AND CHANGE EVERY 10 DAYS    Continuous Glucose Transmitter (DEXCOM G6 TRANSMITTER) MISC [Pharmacy Med Name: DEXCOM G6 TRANSMIT  MIS] 1 each 0     Sig: USE AS DIRECTED AND CHANGE EVERY 90 DAYS

## 2024-10-16 DIAGNOSIS — E11.51 DIABETES TYPE 2 WITH ATHEROSCLEROSIS OF ARTERIES OF EXTREMITIES (HCC): ICD-10-CM

## 2024-10-16 DIAGNOSIS — I70.209 DIABETES TYPE 2 WITH ATHEROSCLEROSIS OF ARTERIES OF EXTREMITIES (HCC): ICD-10-CM

## 2024-10-16 RX ORDER — PROCHLORPERAZINE 25 MG/1
SUPPOSITORY RECTAL
Qty: 9 EACH | Refills: 3 | OUTPATIENT
Start: 2024-10-16

## 2024-10-16 NOTE — TELEPHONE ENCOUNTER
Received fax from pharmacy requesting refill on pts medication(s). Pt was last seen in office on 12/14/2023  and has a follow up scheduled for Visit date not found. Will send request to  Stephanie Shell  for authorization.     Requested Prescriptions     Pending Prescriptions Disp Refills    Continuous Glucose Sensor (DEXCOM G6 SENSOR) MISC [Pharmacy Med Name: DEXCOM G6 SENSOR    MIS] 9 each 3     Sig: USE AS DIRECTED AND CHANGE EVERY  10  DAYS

## 2024-10-22 DIAGNOSIS — E11.51 DIABETES TYPE 2 WITH ATHEROSCLEROSIS OF ARTERIES OF EXTREMITIES (HCC): ICD-10-CM

## 2024-10-22 DIAGNOSIS — I70.209 DIABETES TYPE 2 WITH ATHEROSCLEROSIS OF ARTERIES OF EXTREMITIES (HCC): ICD-10-CM

## 2024-10-23 RX ORDER — PROCHLORPERAZINE 25 MG/1
SUPPOSITORY RECTAL
Qty: 6 EACH | Refills: 0 | OUTPATIENT
Start: 2024-10-23

## 2024-10-23 NOTE — TELEPHONE ENCOUNTER
Pt no longer pt of Stephanie Shell.     Reason for Refusal: Patient no longer under prescriber care   Refused By: Zena Shell, APRN